# Patient Record
Sex: MALE | Race: WHITE | NOT HISPANIC OR LATINO | Employment: FULL TIME | URBAN - METROPOLITAN AREA
[De-identification: names, ages, dates, MRNs, and addresses within clinical notes are randomized per-mention and may not be internally consistent; named-entity substitution may affect disease eponyms.]

---

## 2023-03-04 LAB
CHOLESTEROL (MG/DL) IN SER/PLAS: 203 MG/DL (ref 0–199)
CHOLESTEROL IN HDL (MG/DL) IN SER/PLAS: 38.7 MG/DL
CHOLESTEROL/HDL RATIO: 5.2
LDL: 131 MG/DL (ref 0–99)
TRIGLYCERIDE (MG/DL) IN SER/PLAS: 167 MG/DL (ref 0–149)
VLDL: 33 MG/DL (ref 0–40)

## 2023-03-06 ENCOUNTER — TELEPHONE (OUTPATIENT)
Dept: PRIMARY CARE | Facility: CLINIC | Age: 38
End: 2023-03-06
Payer: COMMERCIAL

## 2023-03-06 NOTE — TELEPHONE ENCOUNTER
----- Message from Chris Perry DO sent at 3/6/2023  8:25 AM EST -----  Call pt his chol was now just slightly elevated but it went from 244 down to 203 this is very good.  His LDL went from 152 to 131. Keep up the good work.    Pt aware

## 2023-03-06 NOTE — RESULT ENCOUNTER NOTE
Call pt his chol was now just slightly elevated but it went from 244 down to 203 this is very good.  His LDL went from 152 to 131. Keep up the good work.

## 2023-09-06 ENCOUNTER — OFFICE VISIT (OUTPATIENT)
Dept: PRIMARY CARE | Facility: CLINIC | Age: 38
End: 2023-09-06
Payer: COMMERCIAL

## 2023-09-06 ENCOUNTER — TELEPHONE (OUTPATIENT)
Dept: PRIMARY CARE | Facility: CLINIC | Age: 38
End: 2023-09-06
Payer: COMMERCIAL

## 2023-09-06 VITALS
SYSTOLIC BLOOD PRESSURE: 128 MMHG | RESPIRATION RATE: 18 BRPM | TEMPERATURE: 97.4 F | HEART RATE: 74 BPM | WEIGHT: 252 LBS | OXYGEN SATURATION: 98 % | DIASTOLIC BLOOD PRESSURE: 82 MMHG | BODY MASS INDEX: 37.21 KG/M2

## 2023-09-06 DIAGNOSIS — R07.89 BURNING IN THE CHEST: ICD-10-CM

## 2023-09-06 DIAGNOSIS — R06.6 HICCUPS: Primary | ICD-10-CM

## 2023-09-06 PROBLEM — J01.90 ACUTE SINUSITIS: Status: ACTIVE | Noted: 2023-09-06

## 2023-09-06 PROBLEM — E78.5 HYPERLIPIDEMIA: Status: ACTIVE | Noted: 2023-09-06

## 2023-09-06 PROBLEM — S60.222A: Status: ACTIVE | Noted: 2023-09-06

## 2023-09-06 PROBLEM — S60.212A CONTUSION OF LEFT WRIST: Status: ACTIVE | Noted: 2023-09-06

## 2023-09-06 PROBLEM — E78.2 COMBINED HYPERLIPIDEMIA: Status: ACTIVE | Noted: 2023-09-06

## 2023-09-06 PROBLEM — R43.8 DECREASED SENSE OF SMELL: Status: ACTIVE | Noted: 2023-09-06

## 2023-09-06 PROBLEM — Q23.1 BICUSPID AORTIC VALVE (HHS-HCC): Status: ACTIVE | Noted: 2023-09-06

## 2023-09-06 PROBLEM — M25.519 SHOULDER PAIN: Status: ACTIVE | Noted: 2023-09-06

## 2023-09-06 PROBLEM — R07.9 CHEST PAIN: Status: ACTIVE | Noted: 2023-09-06

## 2023-09-06 PROBLEM — M25.319 SHOULDER INSTABILITY: Status: ACTIVE | Noted: 2023-09-06

## 2023-09-06 PROBLEM — N46.01 AZOOSPERMIA: Status: ACTIVE | Noted: 2023-09-06

## 2023-09-06 PROBLEM — I47.11 INAPPROPRIATE SINUS TACHYCARDIA (CMS-HCC): Status: ACTIVE | Noted: 2023-09-06

## 2023-09-06 PROBLEM — S43.109A DISLOCATION OF ACROMIOCLAVICULAR JOINT: Status: ACTIVE | Noted: 2023-09-06

## 2023-09-06 PROBLEM — S60.212A: Status: ACTIVE | Noted: 2023-09-06

## 2023-09-06 PROBLEM — J34.3 HYPERTROPHY OF BOTH INFERIOR NASAL TURBINATES: Status: ACTIVE | Noted: 2023-09-06

## 2023-09-06 PROBLEM — J34.89 NASAL OBSTRUCTION: Status: ACTIVE | Noted: 2023-09-06

## 2023-09-06 PROBLEM — Q23.81 BICUSPID AORTIC VALVE: Status: ACTIVE | Noted: 2023-09-06

## 2023-09-06 PROBLEM — J34.2 DEVIATED NASAL SEPTUM: Status: ACTIVE | Noted: 2023-09-06

## 2023-09-06 PROBLEM — R00.0 TACHYCARDIA: Status: ACTIVE | Noted: 2023-09-06

## 2023-09-06 PROBLEM — S43.432A SUPERIOR LABRUM ANTERIOR-TO-POSTERIOR (SLAP) TEAR OF LEFT SHOULDER: Status: ACTIVE | Noted: 2023-09-06

## 2023-09-06 PROBLEM — R06.09 DYSPNEA ON MINIMAL EXERTION: Status: ACTIVE | Noted: 2023-09-06

## 2023-09-06 PROBLEM — R09.81 NASAL CONGESTION: Status: ACTIVE | Noted: 2023-09-06

## 2023-09-06 PROBLEM — S40.012A CONTUSION OF LEFT SHOULDER: Status: ACTIVE | Noted: 2023-09-06

## 2023-09-06 PROBLEM — Z98.52 VASECTOMY STATUS: Status: ACTIVE | Noted: 2023-09-06

## 2023-09-06 PROCEDURE — 99213 OFFICE O/P EST LOW 20 MIN: CPT | Performed by: NURSE PRACTITIONER

## 2023-09-06 PROCEDURE — 1036F TOBACCO NON-USER: CPT | Performed by: NURSE PRACTITIONER

## 2023-09-06 PROCEDURE — 93000 ELECTROCARDIOGRAM COMPLETE: CPT | Performed by: NURSE PRACTITIONER

## 2023-09-06 RX ORDER — METOPROLOL SUCCINATE 25 MG/1
1 TABLET, EXTENDED RELEASE ORAL DAILY
COMMUNITY
Start: 2022-10-18

## 2023-09-06 RX ORDER — BUPROPION HYDROCHLORIDE 150 MG/1
150 TABLET ORAL EVERY MORNING
COMMUNITY

## 2023-09-06 RX ORDER — ONDANSETRON 8 MG/1
TABLET, ORALLY DISINTEGRATING ORAL
COMMUNITY
Start: 2023-02-07 | End: 2023-09-07 | Stop reason: ALTCHOICE

## 2023-09-06 RX ORDER — FLUTICASONE PROPIONATE 50 MCG
2 SPRAY, SUSPENSION (ML) NASAL DAILY
COMMUNITY
Start: 2023-05-03 | End: 2023-09-07 | Stop reason: ALTCHOICE

## 2023-09-06 ASSESSMENT — ENCOUNTER SYMPTOMS
NAUSEA: 0
RHINORRHEA: 0
COUGH: 0
ABDOMINAL PAIN: 0
CHEST TIGHTNESS: 0
VOMITING: 0
FEVER: 0
SORE THROAT: 0
CHILLS: 0
FATIGUE: 0
WHEEZING: 0
DIARRHEA: 0
APPETITE CHANGE: 0
SHORTNESS OF BREATH: 0

## 2023-09-06 NOTE — PROGRESS NOTES
Subjective   Patient ID: Donnell Sanabria is a 38 y.o. male who presents for Hiccups (Hiccups 24hr +).    HPI     Review of Systems    Objective   /82   Pulse 74   Temp 36.3 °C (97.4 °F) (Temporal)   Resp 18   Wt 114 kg (252 lb)   SpO2 98%   BMI 37.21 kg/m²     Physical Exam    Assessment/Plan

## 2023-09-06 NOTE — PROGRESS NOTES
Subjective   Patient ID: Donnell Sanabria is a 38 y.o. male who presents for Hiccups (Hiccups 24hr +).    Patient says that yesterday after dinner, he started having hiccups. He says that he would hold his breath for 5-10 seconds at a time and that would slightly help but he would continue to have hiccups during the night. Patient says that hiccups can occur very frequently throughout the hour and other times it is a minute in between. Patient says that he has been drinking very cold water throughout the day and that does not seem to be helping either. No chest pain. Patient did a physical maneuver in the office to relieve the hiccups, by pulling on his tongue. He says that as he did that, he got a burning sensation in his chest. He says that he has never really noticed this until now.     Patient says that he usually doesn't suffer from acid reflux. He does eat spicy/saucy foods and consumes alcohol socially and coffee daily. Patient had a sal, egg and cheese croissant sandwich with coffee for breakfast and half of a Mexican burrito for lunch.     Review of Systems   Constitutional:  Negative for appetite change, chills, fatigue and fever.   HENT:  Negative for congestion, postnasal drip, rhinorrhea and sore throat.    Respiratory:  Negative for cough, chest tightness, shortness of breath and wheezing.         Patient reports burning in the chest   Cardiovascular:  Negative for chest pain.   Gastrointestinal:  Negative for abdominal pain, diarrhea, nausea and vomiting.       Vitals:    09/06/23 1506   BP: 128/82   Pulse: 74   Resp: 18   Temp: 36.3 °C (97.4 °F)   SpO2: 98%       Physical Exam  Vitals reviewed.   Constitutional:       General: He is not in acute distress.     Appearance: Normal appearance. He is not ill-appearing or toxic-appearing.   HENT:      Head: Atraumatic.      Right Ear: Tympanic membrane, ear canal and external ear normal.      Left Ear: Tympanic membrane, ear canal and external ear normal.       Nose: Nose normal.      Mouth/Throat:      Mouth: Mucous membranes are moist.      Pharynx: Oropharynx is clear. No oropharyngeal exudate or posterior oropharyngeal erythema.   Eyes:      Conjunctiva/sclera: Conjunctivae normal.   Cardiovascular:      Rate and Rhythm: Normal rate and regular rhythm.      Heart sounds: Normal heart sounds. No murmur heard.  Pulmonary:      Effort: Pulmonary effort is normal.      Breath sounds: Normal breath sounds. No wheezing or rhonchi.   Abdominal:      General: Bowel sounds are normal.      Palpations: Abdomen is soft.      Tenderness: There is no abdominal tenderness. There is no guarding.   Musculoskeletal:         General: Normal range of motion.   Skin:     General: Skin is warm and dry.   Neurological:      General: No focal deficit present.      Mental Status: He is alert.   Psychiatric:         Mood and Affect: Mood normal.     Assessment/Plan   Problem List Items Addressed This Visit    None  Visit Diagnoses       Hiccups    -  Primary    Burning in the chest        Relevant Orders    ECG 12 lead (Clinic Performed) (Completed)        Patient held his tongue at the beginning of the visit to help relieve his hiccups. He did not have any hiccups after that point during the visit. Patient said that he had some burning in his chest after holding his tongue in the office. He has no chest pain. EKG done in the office and normal. Discussed with patient that for hiccups lasting less than 48 hours, he is to use physical maneuvers to terminate the hiccups. Pulling on his tongue for several seconds seemed to help. Discussed that he could bite into a lemon, pull his knees to his chest, or perform the valsalva maneuver (for 5-10 seconds) to help relieve his hiccups. Patient has an appt with Dr. Perry tomorrow. He was advised to follow up as scheduled. Patient advised to go to the ER for any worsening hiccups or new/concerning symptoms; he agreed.

## 2023-09-06 NOTE — TELEPHONE ENCOUNTER
Pt called and stated he has had hiccups for over 24 hrs.  I scheduled him for tomorrow at 240 but wanted to check to see if you advised any thing different

## 2023-09-07 ENCOUNTER — OFFICE VISIT (OUTPATIENT)
Dept: PRIMARY CARE | Facility: CLINIC | Age: 38
End: 2023-09-07
Payer: COMMERCIAL

## 2023-09-07 VITALS
RESPIRATION RATE: 18 BRPM | DIASTOLIC BLOOD PRESSURE: 80 MMHG | HEIGHT: 69 IN | BODY MASS INDEX: 36.88 KG/M2 | WEIGHT: 249 LBS | HEART RATE: 84 BPM | SYSTOLIC BLOOD PRESSURE: 124 MMHG | TEMPERATURE: 97.4 F

## 2023-09-07 DIAGNOSIS — R06.6 HICCUPS: Primary | ICD-10-CM

## 2023-09-07 DIAGNOSIS — R07.89 CHEST WALL PAIN: ICD-10-CM

## 2023-09-07 PROBLEM — F43.12 CHRONIC POST-TRAUMATIC STRESS DISORDER (PTSD): Status: ACTIVE | Noted: 2023-09-07

## 2023-09-07 PROBLEM — B07.9 VERRUCA: Status: ACTIVE | Noted: 2023-09-07

## 2023-09-07 PROBLEM — R04.0 EPISTAXIS: Status: ACTIVE | Noted: 2023-09-07

## 2023-09-07 PROBLEM — M21.40 ACQUIRED PES PLANUS: Status: ACTIVE | Noted: 2023-09-07

## 2023-09-07 PROBLEM — F43.12 POST-TRAUMATIC STRESS DISORDER, CHRONIC: Status: ACTIVE | Noted: 2023-09-07

## 2023-09-07 PROBLEM — F32.A DEPRESSIVE DISORDER: Status: ACTIVE | Noted: 2023-09-07

## 2023-09-07 PROBLEM — M54.50 LOW BACK PAIN: Status: ACTIVE | Noted: 2023-09-07

## 2023-09-07 PROBLEM — R10.11 RIGHT UPPER QUADRANT ABDOMINAL PAIN: Status: ACTIVE | Noted: 2023-09-07

## 2023-09-07 PROBLEM — M54.50 LUMBAGO: Status: ACTIVE | Noted: 2023-09-07

## 2023-09-07 PROBLEM — M21.70 ACQUIRED INEQUALITY OF LENGTH OF LOWER EXTREMITY: Status: ACTIVE | Noted: 2023-09-07

## 2023-09-07 PROBLEM — R03.0 ELEVATED BLOOD PRESSURE READING WITHOUT DIAGNOSIS OF HYPERTENSION: Status: ACTIVE | Noted: 2023-09-07

## 2023-09-07 PROCEDURE — 99213 OFFICE O/P EST LOW 20 MIN: CPT | Performed by: FAMILY MEDICINE

## 2023-09-07 ASSESSMENT — ENCOUNTER SYMPTOMS
HEADACHES: 0
RESPIRATORY NEGATIVE: 1
MUSCULOSKELETAL NEGATIVE: 1
ALLERGIC/IMMUNOLOGIC NEGATIVE: 1
NUMBNESS: 0
NAUSEA: 0
LIGHT-HEADEDNESS: 0
SPEECH DIFFICULTY: 0
ENDOCRINE NEGATIVE: 1
DIZZINESS: 0
CONSTITUTIONAL NEGATIVE: 1
ABDOMINAL PAIN: 0
VOMITING: 0
EYES NEGATIVE: 1
ABDOMINAL DISTENTION: 0
TREMORS: 0
HEMATOLOGIC/LYMPHATIC NEGATIVE: 1
PSYCHIATRIC NEGATIVE: 1
GASTROINTESTINAL NEGATIVE: 1

## 2023-09-07 NOTE — PROGRESS NOTES
Subjective   Patient ID: Donnell Sanabria is a 38 y.o. male who presents for Follow-up (1 day follow up from [facility] care for hiccups over the past 24 hrs. He states hick ups have subsided.   Pt states he does have chest heaviness and tightness.  ).  HPI    Review of Systems   Constitutional: Negative.    HENT: Negative.     Eyes: Negative.    Respiratory: Negative.     Cardiovascular:  Positive for chest pain.   Gastrointestinal: Negative.  Negative for abdominal distention, abdominal pain, nausea and vomiting.   Endocrine: Negative.    Genitourinary: Negative.    Musculoskeletal: Negative.    Skin: Negative.    Allergic/Immunologic: Negative.    Neurological:  Negative for dizziness, tremors, speech difficulty, light-headedness, numbness and headaches.   Hematological: Negative.    Psychiatric/Behavioral: Negative.         Objective   Physical Exam  Constitutional:       Appearance: Normal appearance.   HENT:      Head: Normocephalic.   Eyes:      Conjunctiva/sclera: Conjunctivae normal.   Cardiovascular:      Rate and Rhythm: Normal rate and regular rhythm.   Pulmonary:      Effort: Pulmonary effort is normal.      Breath sounds: Normal breath sounds.   Chest:          Comments: Tenderness anterior chest wall  Musculoskeletal:      Cervical back: Neck supple.   Skin:     General: Skin is warm and dry.   Neurological:      Mental Status: He is alert.         Assessment/Plan   Problem List Items Addressed This Visit    None  Visit Diagnoses       Hiccups    -  Primary    Chest wall pain            Suspect chest wasll pain was form 36 hours of hiccups.  Recommended pt use prilosec for 10-14 days to calm down esophagus and if symptoms return we will check labs and possible CT

## 2023-10-25 ENCOUNTER — HOSPITAL ENCOUNTER (OUTPATIENT)
Dept: CARDIOLOGY | Facility: CLINIC | Age: 38
Discharge: HOME | End: 2023-10-25
Payer: COMMERCIAL

## 2023-10-25 DIAGNOSIS — Z01.818 PRE-OP EXAM: ICD-10-CM

## 2023-10-25 PROCEDURE — 93010 ELECTROCARDIOGRAM REPORT: CPT | Performed by: INTERNAL MEDICINE

## 2023-10-25 PROCEDURE — 93005 ELECTROCARDIOGRAM TRACING: CPT

## 2023-11-05 LAB
ATRIAL RATE: 84 BPM
P AXIS: 14 DEGREES
P OFFSET: 180 MS
P ONSET: 121 MS
PR INTERVAL: 176 MS
Q ONSET: 209 MS
QRS COUNT: 14 BEATS
QRS DURATION: 96 MS
QT INTERVAL: 360 MS
QTC CALCULATION(BAZETT): 425 MS
QTC FREDERICIA: 402 MS
R AXIS: 17 DEGREES
T AXIS: 1 DEGREES
T OFFSET: 389 MS
VENTRICULAR RATE: 84 BPM

## 2023-11-06 ENCOUNTER — LAB (OUTPATIENT)
Dept: LAB | Facility: LAB | Age: 38
End: 2023-11-06
Payer: COMMERCIAL

## 2023-11-06 DIAGNOSIS — Z01.812 ENCOUNTER FOR PREPROCEDURAL LABORATORY EXAMINATION: Primary | ICD-10-CM

## 2023-11-06 LAB
ANION GAP SERPL CALC-SCNC: 13 MMOL/L (ref 10–20)
BUN SERPL-MCNC: 16 MG/DL (ref 6–23)
CALCIUM SERPL-MCNC: 9.7 MG/DL (ref 8.6–10.3)
CHLORIDE SERPL-SCNC: 102 MMOL/L (ref 98–107)
CO2 SERPL-SCNC: 25 MMOL/L (ref 21–32)
CREAT SERPL-MCNC: 0.97 MG/DL (ref 0.5–1.3)
ERYTHROCYTE [DISTWIDTH] IN BLOOD BY AUTOMATED COUNT: 12.2 % (ref 11.5–14.5)
GFR SERPL CREATININE-BSD FRML MDRD: >90 ML/MIN/1.73M*2
GLUCOSE SERPL-MCNC: 81 MG/DL (ref 74–99)
HCT VFR BLD AUTO: 45.9 % (ref 41–52)
HGB BLD-MCNC: 15 G/DL (ref 13.5–17.5)
INR PPP: 0.9 (ref 0.9–1.1)
MCH RBC QN AUTO: 29.3 PG (ref 26–34)
MCHC RBC AUTO-ENTMCNC: 32.7 G/DL (ref 32–36)
MCV RBC AUTO: 90 FL (ref 80–100)
NRBC BLD-RTO: 0 /100 WBCS (ref 0–0)
PLATELET # BLD AUTO: 262 X10*3/UL (ref 150–450)
POTASSIUM SERPL-SCNC: 4.1 MMOL/L (ref 3.5–5.3)
PROTHROMBIN TIME: 10.6 SECONDS (ref 9.8–12.8)
RBC # BLD AUTO: 5.12 X10*6/UL (ref 4.5–5.9)
SODIUM SERPL-SCNC: 136 MMOL/L (ref 136–145)
WBC # BLD AUTO: 7 X10*3/UL (ref 4.4–11.3)

## 2023-11-06 PROCEDURE — 36415 COLL VENOUS BLD VENIPUNCTURE: CPT

## 2023-11-06 PROCEDURE — 85610 PROTHROMBIN TIME: CPT

## 2023-11-06 PROCEDURE — 85027 COMPLETE CBC AUTOMATED: CPT

## 2023-11-06 PROCEDURE — 80048 BASIC METABOLIC PNL TOTAL CA: CPT

## 2023-11-13 ENCOUNTER — ANESTHESIA (OUTPATIENT)
Dept: OPERATING ROOM | Facility: CLINIC | Age: 38
End: 2023-11-13
Payer: COMMERCIAL

## 2023-11-13 ENCOUNTER — HOSPITAL ENCOUNTER (OUTPATIENT)
Facility: CLINIC | Age: 38
Setting detail: OUTPATIENT SURGERY
Discharge: HOME | End: 2023-11-13
Attending: OTOLARYNGOLOGY | Admitting: OTOLARYNGOLOGY
Payer: COMMERCIAL

## 2023-11-13 ENCOUNTER — ANESTHESIA EVENT (OUTPATIENT)
Dept: OPERATING ROOM | Facility: CLINIC | Age: 38
End: 2023-11-13
Payer: COMMERCIAL

## 2023-11-13 VITALS
OXYGEN SATURATION: 94 % | WEIGHT: 253.97 LBS | RESPIRATION RATE: 18 BRPM | DIASTOLIC BLOOD PRESSURE: 78 MMHG | HEART RATE: 81 BPM | BODY MASS INDEX: 37.62 KG/M2 | HEIGHT: 69 IN | SYSTOLIC BLOOD PRESSURE: 126 MMHG | TEMPERATURE: 97 F

## 2023-11-13 DIAGNOSIS — J34.89 NASAL OBSTRUCTION: Primary | ICD-10-CM

## 2023-11-13 DIAGNOSIS — J34.2 DEVIATED NASAL SEPTUM: ICD-10-CM

## 2023-11-13 PROCEDURE — 3700000002 HC GENERAL ANESTHESIA TIME - EACH INCREMENTAL 1 MINUTE: Performed by: OTOLARYNGOLOGY

## 2023-11-13 PROCEDURE — A30420 PR RECONSTR NOSE+MAJ SEPTAL REPAIR: Performed by: ANESTHESIOLOGY

## 2023-11-13 PROCEDURE — 2500000001 HC RX 250 WO HCPCS SELF ADMINISTERED DRUGS (ALT 637 FOR MEDICARE OP): Performed by: ANESTHESIOLOGY

## 2023-11-13 PROCEDURE — 2500000005 HC RX 250 GENERAL PHARMACY W/O HCPCS: Performed by: NURSE ANESTHETIST, CERTIFIED REGISTERED

## 2023-11-13 PROCEDURE — 7100000009 HC PHASE TWO TIME - INITIAL BASE CHARGE: Performed by: OTOLARYNGOLOGY

## 2023-11-13 PROCEDURE — 7100000010 HC PHASE TWO TIME - EACH INCREMENTAL 1 MINUTE: Performed by: OTOLARYNGOLOGY

## 2023-11-13 PROCEDURE — 7100000002 HC RECOVERY ROOM TIME - EACH INCREMENTAL 1 MINUTE: Performed by: OTOLARYNGOLOGY

## 2023-11-13 PROCEDURE — 2500000005 HC RX 250 GENERAL PHARMACY W/O HCPCS: Performed by: OTOLARYNGOLOGY

## 2023-11-13 PROCEDURE — 30465 REPAIR NASAL STENOSIS: CPT | Performed by: OTOLARYNGOLOGY

## 2023-11-13 PROCEDURE — 2500000001 HC RX 250 WO HCPCS SELF ADMINISTERED DRUGS (ALT 637 FOR MEDICARE OP): Performed by: OTOLARYNGOLOGY

## 2023-11-13 PROCEDURE — 31231 NASAL ENDOSCOPY DX: CPT | Performed by: OTOLARYNGOLOGY

## 2023-11-13 PROCEDURE — A30420 PR RECONSTR NOSE+MAJ SEPTAL REPAIR: Performed by: NURSE ANESTHETIST, CERTIFIED REGISTERED

## 2023-11-13 PROCEDURE — 2500000002 HC RX 250 W HCPCS SELF ADMINISTERED DRUGS (ALT 637 FOR MEDICARE OP, ALT 636 FOR OP/ED): Performed by: ANESTHESIOLOGY

## 2023-11-13 PROCEDURE — 3600000008 HC OR TIME - EACH INCREMENTAL 1 MINUTE - PROCEDURE LEVEL THREE: Performed by: OTOLARYNGOLOGY

## 2023-11-13 PROCEDURE — 30520 REPAIR OF NASAL SEPTUM: CPT | Performed by: OTOLARYNGOLOGY

## 2023-11-13 PROCEDURE — 3600000003 HC OR TIME - INITIAL BASE CHARGE - PROCEDURE LEVEL THREE: Performed by: OTOLARYNGOLOGY

## 2023-11-13 PROCEDURE — 2500000004 HC RX 250 GENERAL PHARMACY W/ HCPCS (ALT 636 FOR OP/ED): Performed by: NURSE ANESTHETIST, CERTIFIED REGISTERED

## 2023-11-13 PROCEDURE — 2720000007 HC OR 272 NO HCPCS: Performed by: OTOLARYNGOLOGY

## 2023-11-13 PROCEDURE — 30140 RESECT INFERIOR TURBINATE: CPT | Performed by: OTOLARYNGOLOGY

## 2023-11-13 PROCEDURE — 3700000001 HC GENERAL ANESTHESIA TIME - INITIAL BASE CHARGE: Performed by: OTOLARYNGOLOGY

## 2023-11-13 PROCEDURE — 7100000001 HC RECOVERY ROOM TIME - INITIAL BASE CHARGE: Performed by: OTOLARYNGOLOGY

## 2023-11-13 RX ORDER — LABETALOL HYDROCHLORIDE 5 MG/ML
5 INJECTION, SOLUTION INTRAVENOUS ONCE AS NEEDED
Status: DISCONTINUED | OUTPATIENT
Start: 2023-11-13 | End: 2023-11-13 | Stop reason: HOSPADM

## 2023-11-13 RX ORDER — OXYCODONE HYDROCHLORIDE 5 MG/1
5 TABLET ORAL EVERY 4 HOURS PRN
Status: DISCONTINUED | OUTPATIENT
Start: 2023-11-13 | End: 2023-11-13 | Stop reason: HOSPADM

## 2023-11-13 RX ORDER — SODIUM CHLORIDE, SODIUM LACTATE, POTASSIUM CHLORIDE, CALCIUM CHLORIDE 600; 310; 30; 20 MG/100ML; MG/100ML; MG/100ML; MG/100ML
100 INJECTION, SOLUTION INTRAVENOUS CONTINUOUS
Status: DISCONTINUED | OUTPATIENT
Start: 2023-11-13 | End: 2023-11-13 | Stop reason: HOSPADM

## 2023-11-13 RX ORDER — DEXAMETHASONE SODIUM PHOSPHATE 100 MG/10ML
INJECTION INTRAMUSCULAR; INTRAVENOUS AS NEEDED
Status: DISCONTINUED | OUTPATIENT
Start: 2023-11-13 | End: 2023-11-13

## 2023-11-13 RX ORDER — ONDANSETRON HYDROCHLORIDE 2 MG/ML
INJECTION, SOLUTION INTRAVENOUS AS NEEDED
Status: DISCONTINUED | OUTPATIENT
Start: 2023-11-13 | End: 2023-11-13

## 2023-11-13 RX ORDER — TRANEXAMIC ACID 100 MG/ML
INJECTION, SOLUTION INTRAVENOUS AS NEEDED
Status: DISCONTINUED | OUTPATIENT
Start: 2023-11-13 | End: 2023-11-13

## 2023-11-13 RX ORDER — HYDROMORPHONE HYDROCHLORIDE 1 MG/ML
0.5 INJECTION, SOLUTION INTRAMUSCULAR; INTRAVENOUS; SUBCUTANEOUS EVERY 5 MIN PRN
Status: DISCONTINUED | OUTPATIENT
Start: 2023-11-13 | End: 2023-11-13 | Stop reason: HOSPADM

## 2023-11-13 RX ORDER — LIDOCAINE HYDROCHLORIDE 10 MG/ML
0.1 INJECTION, SOLUTION EPIDURAL; INFILTRATION; INTRACAUDAL; PERINEURAL ONCE
Status: DISCONTINUED | OUTPATIENT
Start: 2023-11-13 | End: 2023-11-13 | Stop reason: HOSPADM

## 2023-11-13 RX ORDER — LIDOCAINE HYDROCHLORIDE 20 MG/ML
INJECTION, SOLUTION INFILTRATION; PERINEURAL AS NEEDED
Status: DISCONTINUED | OUTPATIENT
Start: 2023-11-13 | End: 2023-11-13

## 2023-11-13 RX ORDER — CELECOXIB 200 MG/1
CAPSULE ORAL AS NEEDED
Status: DISCONTINUED | OUTPATIENT
Start: 2023-11-13 | End: 2023-11-13

## 2023-11-13 RX ORDER — SUCCINYLCHOLINE CHLORIDE 100 MG/5ML
SYRINGE (ML) INTRAVENOUS AS NEEDED
Status: DISCONTINUED | OUTPATIENT
Start: 2023-11-13 | End: 2023-11-13

## 2023-11-13 RX ORDER — GABAPENTIN 300 MG/1
CAPSULE ORAL AS NEEDED
Status: DISCONTINUED | OUTPATIENT
Start: 2023-11-13 | End: 2023-11-13

## 2023-11-13 RX ORDER — PROPOFOL 10 MG/ML
INJECTION, EMULSION INTRAVENOUS AS NEEDED
Status: DISCONTINUED | OUTPATIENT
Start: 2023-11-13 | End: 2023-11-13

## 2023-11-13 RX ORDER — POVIDONE-IODINE 5 %
SOLUTION, NON-ORAL OPHTHALMIC (EYE) AS NEEDED
Status: DISCONTINUED | OUTPATIENT
Start: 2023-11-13 | End: 2023-11-13 | Stop reason: HOSPADM

## 2023-11-13 RX ORDER — MIDAZOLAM HYDROCHLORIDE 1 MG/ML
INJECTION, SOLUTION INTRAMUSCULAR; INTRAVENOUS AS NEEDED
Status: DISCONTINUED | OUTPATIENT
Start: 2023-11-13 | End: 2023-11-13

## 2023-11-13 RX ORDER — DOCUSATE SODIUM 100 MG/1
100 CAPSULE, LIQUID FILLED ORAL 2 TIMES DAILY PRN
Qty: 60 CAPSULE | Refills: 0 | Status: SHIPPED | OUTPATIENT
Start: 2023-11-13 | End: 2023-11-28 | Stop reason: ALTCHOICE

## 2023-11-13 RX ORDER — SODIUM CHLORIDE 0.65 %
2 AEROSOL, SPRAY (ML) NASAL
Qty: 44 ML | Refills: 3 | Status: SHIPPED | OUTPATIENT
Start: 2023-11-13 | End: 2024-01-24 | Stop reason: ALTCHOICE

## 2023-11-13 RX ORDER — LIDOCAINE HYDROCHLORIDE AND EPINEPHRINE 10; 10 MG/ML; UG/ML
INJECTION, SOLUTION INFILTRATION; PERINEURAL AS NEEDED
Status: DISCONTINUED | OUTPATIENT
Start: 2023-11-13 | End: 2023-11-13 | Stop reason: HOSPADM

## 2023-11-13 RX ORDER — ONDANSETRON 4 MG/1
4 TABLET, FILM COATED ORAL EVERY 6 HOURS PRN
Qty: 10 TABLET | Refills: 0 | Status: SHIPPED | OUTPATIENT
Start: 2023-11-13 | End: 2023-11-20

## 2023-11-13 RX ORDER — CEFAZOLIN 1 G/1
INJECTION, POWDER, FOR SOLUTION INTRAVENOUS AS NEEDED
Status: DISCONTINUED | OUTPATIENT
Start: 2023-11-13 | End: 2023-11-13

## 2023-11-13 RX ORDER — HYDROMORPHONE HYDROCHLORIDE 1 MG/ML
INJECTION, SOLUTION INTRAMUSCULAR; INTRAVENOUS; SUBCUTANEOUS AS NEEDED
Status: DISCONTINUED | OUTPATIENT
Start: 2023-11-13 | End: 2023-11-13

## 2023-11-13 RX ORDER — ALBUTEROL SULFATE 0.83 MG/ML
2.5 SOLUTION RESPIRATORY (INHALATION) ONCE AS NEEDED
Status: DISCONTINUED | OUTPATIENT
Start: 2023-11-13 | End: 2023-11-13 | Stop reason: HOSPADM

## 2023-11-13 RX ORDER — CEPHALEXIN 500 MG/1
500 CAPSULE ORAL 2 TIMES DAILY
Qty: 8 CAPSULE | Refills: 0 | Status: SHIPPED | OUTPATIENT
Start: 2023-11-13 | End: 2023-11-17

## 2023-11-13 RX ORDER — MUPIROCIN 20 MG/G
OINTMENT TOPICAL AS NEEDED
Status: DISCONTINUED | OUTPATIENT
Start: 2023-11-13 | End: 2023-11-13 | Stop reason: HOSPADM

## 2023-11-13 RX ORDER — TRANEXAMIC ACID 100 MG/ML
INJECTION, SOLUTION INTRAVENOUS AS NEEDED
Status: DISCONTINUED | OUTPATIENT
Start: 2023-11-13 | End: 2023-11-13 | Stop reason: HOSPADM

## 2023-11-13 RX ORDER — OXYMETAZOLINE HCL 0.05 %
SPRAY, NON-AEROSOL (ML) NASAL AS NEEDED
Status: DISCONTINUED | OUTPATIENT
Start: 2023-11-13 | End: 2023-11-13 | Stop reason: HOSPADM

## 2023-11-13 RX ORDER — FENTANYL CITRATE 50 UG/ML
INJECTION, SOLUTION INTRAMUSCULAR; INTRAVENOUS AS NEEDED
Status: DISCONTINUED | OUTPATIENT
Start: 2023-11-13 | End: 2023-11-13

## 2023-11-13 RX ORDER — PROPOFOL 10 MG/ML
INJECTION, EMULSION INTRAVENOUS CONTINUOUS PRN
Status: DISCONTINUED | OUTPATIENT
Start: 2023-11-13 | End: 2023-11-13

## 2023-11-13 RX ORDER — APREPITANT 40 MG/1
CAPSULE ORAL AS NEEDED
Status: DISCONTINUED | OUTPATIENT
Start: 2023-11-13 | End: 2023-11-13

## 2023-11-13 RX ORDER — ACETAMINOPHEN 325 MG/1
TABLET ORAL AS NEEDED
Status: DISCONTINUED | OUTPATIENT
Start: 2023-11-13 | End: 2023-11-13

## 2023-11-13 RX ORDER — METHOCARBAMOL 100 MG/ML
INJECTION, SOLUTION INTRAMUSCULAR; INTRAVENOUS AS NEEDED
Status: DISCONTINUED | OUTPATIENT
Start: 2023-11-13 | End: 2023-11-13

## 2023-11-13 RX ORDER — OXYCODONE AND ACETAMINOPHEN 5; 325 MG/1; MG/1
1 TABLET ORAL EVERY 6 HOURS PRN
Qty: 20 TABLET | Refills: 0 | Status: SHIPPED | OUTPATIENT
Start: 2023-11-13 | End: 2023-11-28 | Stop reason: ALTCHOICE

## 2023-11-13 RX ORDER — BUPIVACAINE HYDROCHLORIDE 5 MG/ML
INJECTION, SOLUTION PERINEURAL AS NEEDED
Status: DISCONTINUED | OUTPATIENT
Start: 2023-11-13 | End: 2023-11-13 | Stop reason: HOSPADM

## 2023-11-13 RX ORDER — ONDANSETRON HYDROCHLORIDE 2 MG/ML
4 INJECTION, SOLUTION INTRAVENOUS ONCE AS NEEDED
Status: DISCONTINUED | OUTPATIENT
Start: 2023-11-13 | End: 2023-11-13 | Stop reason: HOSPADM

## 2023-11-13 RX ADMIN — MIDAZOLAM 2 MG: 1 INJECTION INTRAMUSCULAR; INTRAVENOUS at 07:38

## 2023-11-13 RX ADMIN — TRANEXAMIC ACID 1000 MG: 100 INJECTION, SOLUTION INTRAVENOUS at 08:35

## 2023-11-13 RX ADMIN — ONDANSETRON 4 MG: 2 INJECTION INTRAMUSCULAR; INTRAVENOUS at 10:00

## 2023-11-13 RX ADMIN — FENTANYL CITRATE 100 MCG: 50 INJECTION, SOLUTION INTRAMUSCULAR; INTRAVENOUS at 07:43

## 2023-11-13 RX ADMIN — METHOCARBAMOL 500 MG: 100 INJECTION, SOLUTION INTRAMUSCULAR; INTRAVENOUS at 08:55

## 2023-11-13 RX ADMIN — PROPOFOL 250 MG: 10 INJECTION, EMULSION INTRAVENOUS at 07:43

## 2023-11-13 RX ADMIN — GABAPENTIN 300 MG: 300 CAPSULE ORAL at 07:30

## 2023-11-13 RX ADMIN — CELECOXIB 200 MG: 200 CAPSULE ORAL at 07:30

## 2023-11-13 RX ADMIN — HYDROMORPHONE HYDROCHLORIDE 0.2 MG: 1 INJECTION, SOLUTION INTRAMUSCULAR; INTRAVENOUS; SUBCUTANEOUS at 09:45

## 2023-11-13 RX ADMIN — OXYCODONE 5 MG: 5 TABLET ORAL at 11:37

## 2023-11-13 RX ADMIN — CEFAZOLIN 2 G: 1 INJECTION, POWDER, FOR SOLUTION INTRAMUSCULAR; INTRAVENOUS at 07:54

## 2023-11-13 RX ADMIN — PROPOFOL 75 MCG/KG/MIN: 10 INJECTION, EMULSION INTRAVENOUS at 07:48

## 2023-11-13 RX ADMIN — ACETAMINOPHEN 975 MG: 325 TABLET ORAL at 07:30

## 2023-11-13 RX ADMIN — SODIUM CHLORIDE, SODIUM LACTATE, POTASSIUM CHLORIDE, AND CALCIUM CHLORIDE: .6; .31; .03; .02 INJECTION, SOLUTION INTRAVENOUS at 07:38

## 2023-11-13 RX ADMIN — APREPITANT 40 MG: 40 CAPSULE ORAL at 07:30

## 2023-11-13 RX ADMIN — METHOCARBAMOL 500 MG: 100 INJECTION, SOLUTION INTRAMUSCULAR; INTRAVENOUS at 09:32

## 2023-11-13 RX ADMIN — HYDROMORPHONE HYDROCHLORIDE 0.3 MG: 1 INJECTION, SOLUTION INTRAMUSCULAR; INTRAVENOUS; SUBCUTANEOUS at 09:14

## 2023-11-13 RX ADMIN — FENTANYL CITRATE 25 MCG: 50 INJECTION, SOLUTION INTRAMUSCULAR; INTRAVENOUS at 08:04

## 2023-11-13 RX ADMIN — Medication 100 MG: at 07:43

## 2023-11-13 RX ADMIN — FENTANYL CITRATE 25 MCG: 50 INJECTION, SOLUTION INTRAMUSCULAR; INTRAVENOUS at 08:16

## 2023-11-13 RX ADMIN — DEXAMETHASONE SODIUM PHOSPHATE 10 MG: 10 INJECTION INTRAMUSCULAR; INTRAVENOUS at 07:54

## 2023-11-13 RX ADMIN — FENTANYL CITRATE 25 MCG: 50 INJECTION, SOLUTION INTRAMUSCULAR; INTRAVENOUS at 08:45

## 2023-11-13 RX ADMIN — LIDOCAINE HYDROCHLORIDE 100 MG: 20 INJECTION, SOLUTION INFILTRATION; PERINEURAL at 07:43

## 2023-11-13 ASSESSMENT — COLUMBIA-SUICIDE SEVERITY RATING SCALE - C-SSRS
1. IN THE PAST MONTH, HAVE YOU WISHED YOU WERE DEAD OR WISHED YOU COULD GO TO SLEEP AND NOT WAKE UP?: NO
6. HAVE YOU EVER DONE ANYTHING, STARTED TO DO ANYTHING, OR PREPARED TO DO ANYTHING TO END YOUR LIFE?: NO
2. HAVE YOU ACTUALLY HAD ANY THOUGHTS OF KILLING YOURSELF?: NO

## 2023-11-13 ASSESSMENT — PAIN SCALES - GENERAL
PAINLEVEL_OUTOF10: 0 - NO PAIN
PAINLEVEL_OUTOF10: 0 - NO PAIN
PAINLEVEL_OUTOF10: 5 - MODERATE PAIN
PAINLEVEL_OUTOF10: 0 - NO PAIN
PAINLEVEL_OUTOF10: 4

## 2023-11-13 ASSESSMENT — PAIN - FUNCTIONAL ASSESSMENT: PAIN_FUNCTIONAL_ASSESSMENT: 0-10

## 2023-11-13 NOTE — ANESTHESIA PREPROCEDURE EVALUATION
Patient: Donnell Sanabria    Procedure Information       Date/Time: 11/13/23 1430    Procedure: SEPTO-RHINOPLASTY, INFERIOR TURBINATE REDUCTION, REPAIR NASAL VALVES, NASAL ENDOCSOPY (Nose)    Location: [facility] OR 04 / [facility] OR    Surgeons: Jonathan K Frankel, MD            Relevant Problems   Anesthesia (within normal limits)      Cardiovascular   (+) Bicuspid aortic valve (Being followed by cardiology)   (+) Chest pain   (+) Combined hyperlipidemia   (+) Hyperlipidemia   (+) Inappropriate sinus tachycardia      /Renal   (+) Renal calculi      Neuro/Psych   (+) Chronic post-traumatic stress disorder (PTSD)   (+) Depressive disorder   (+) Post traumatic stress disorder (PTSD)   (+) Post-traumatic stress disorder, chronic      Pulmonary   (+) Dyspnea on minimal exertion      GI/Hepatic (within normal limits)      Infectious Disease   (+) Herpes simplex of male genitalia   (+) Verruca       Clinical information reviewed:   Tobacco  Allergies  Meds   Med Hx  Surg Hx   Fam Hx          NPO Detail:  NPO/Void Status  Carbonhydrate Drink Given Prior to Surgery? : N  Date of Last Liquid: 11/12/23  Time of Last Liquid: 2100  Date of Last Solid: 11/12/23  Time of Last Solid: 2100  Last Intake Type: Clear fluids         Physical Exam    Airway  Mallampati: II  TM distance: >3 FB  Neck ROM: full     Cardiovascular    Dental - normal exam     Pulmonary    Abdominal        Anesthesia Plan    ASA 2     general     intravenous induction   Anesthetic plan and risks discussed with patient.    Plan discussed with CRNA.

## 2023-11-13 NOTE — OP NOTE
Nephrology Progress Note:    ESRD.  Patient had hemodialysis yesterday.  On hemodialysis Monday Wednesday and Friday.  Feels well.  Planned discharge home today.  No shortness of breath.  Denies nausea or vomiting.  Anxious to go home.  Has PermCath in place.    Patient Active Problem List   Diagnosis   • PVD (peripheral vascular disease)   • Status post bilateral below knee amputation   • Renal failure, chronic   • Gastroesophageal reflux disease   • Constipation   • Anxiety   • ESRD on hemodialysis   • A-V fistula   • Symptomatic bradycardia   • Hypertension   • Diabetes mellitus   • Gangrene of finger   • ESRD (end stage renal disease)   • Finger pain, right   • Type 2 diabetes mellitus without complication, with long-term current use of insulin   • Carpal tunnel syndrome of right wrist   • Acute bacterial conjunctivitis of both eyes   • Community acquired pneumonia of left lower lobe of lung   • Acute renal failure superimposed on chronic kidney disease, on chronic dialysis   • Hyponatremia   • Skin irritation - groin   • Hypothermia   • Loculated pleural effusion   • Other constipation       Medications:    amLODIPine 5 mg Oral Daily   atorvastatin 20 mg Oral Nightly   citalopram 20 mg Oral Daily   clopidogrel 75 mg Oral Nightly   epoetin unruly 3,000 Units Intravenous Once per day on Mon Wed Fri   ferrous sulfate 324 mg Oral Daily With Breakfast   hydrALAZINE 50 mg Oral TID   insulin aspart 0-7 Units Subcutaneous 4x Daily AC & at Bedtime   ipratropium-albuterol 3 mL Nebulization Q4H - RT   levoFLOXacin 750 mg Oral Every Other Day   metoclopramide 5 mg Oral TID AC   metoprolol tartrate 25 mg Oral Q12H   pantoprazole 40 mg Oral Daily   sennosides-docusate sodium 2 tablet Oral Nightly   sevelamer 800 mg Oral TID With Meals   trimethoprim-polymyxin b 1 drop Both Eyes Q4H       hold 1 each     Vitals:    04/21/18 0657 04/21/18 0802 04/21/18 0915 04/21/18 1238   BP:   180/66 143/64   BP Location:   Left arm    Patient  SEPTO-RHINOPLASTY, INFERIOR TURBINATE REDUCTION, REPAIR NASAL VALVES, NASAL ENDOCSOPY Operative Note     Date: 2023  OR Location: [facility] OR    Name: Donnell Sanabria : 1985, Age: 38 y.o., MRN: [id number], Sex: male    Diagnosis  Pre-op Diagnosis     * Deviated nasal septum [J34.2] Post-op Diagnosis     * Deviated nasal septum [J34.2]     Procedures  SEPTO-RHINOPLASTY, INFERIOR TURBINATE REDUCTION, REPAIR NASAL VALVES, NASAL ENDOCSOPY  29166 - MS SEPTOPLASTY/SUBMUCOUS RESECJ W/WO CARTILAGE GRF    SEPTO-RHINOPLASTY, INFERIOR TURBINATE REDUCTION, REPAIR NASAL VALVES, NASAL ENDOCSOPY  06612 - MS REPAIR NASAL VESTIBULAR STENOSIS    SEPTO-RHINOPLASTY, INFERIOR TURBINATE REDUCTION, REPAIR NASAL VALVES, NASAL ENDOCSOPY  43328 - MS SUBMUCOUS RESCJ INFERIOR TURBINATE PRTL/COMPL    SEPTO-RHINOPLASTY, INFERIOR TURBINATE REDUCTION, REPAIR NASAL VALVES, NASAL ENDOCSOPY  48539 - MS NASAL ENDOSCOPY DIAGNOSTIC UNI/BI SPX      Surgeons      * Jonathan K Frankel - Primary    Resident/Fellow/Other Assistant:  Surgeon(s) and Role:    Procedure Summary  Anesthesia: General  ASA: II  Anesthesia Staff: Anesthesiologist: Desean Salazar DO  CRNA: NAV Smith-CRNA  Estimated Blood Loss: 20 mL  Intra-op Medications:   Medication Name Total Dose   tranexamic acid (Cyklokapron) injection 0.2 g   lidocaine-epinephrine (Xylocaine W/EPI) 1 %-1:100,000 injection 10 mL   mupirocin (Bactroban) 2 % ointment 1 Application   oxymetazoline (Afrin) 0.05 % nasal spray 2 spray   povidone-iodine 5 % ophthalmic solution 1 Application   BUPivacaine HCl (Marcaine) 0.5 % (5 mg/mL) injection 10 mL              Anesthesia Record               Intraprocedure I/O Totals          Intake    .00 mL    Propofol Drip 0.00 mL    The total shown is the total volume documented since Anesthesia Start was filed.    Total Intake 750 mL       Output    Est. Blood Loss 30 mL    Total Output 30 mL       Net    Net Volume 720 mL           Specimen: No specimens collected     Staff:   Circulator: Laurent Blair RN; Valeria Guardado RN  Scrub Person: Elisabet Cifuentes         Drains and/or Catheters: * None in log *            Findings: Dorsal and caudal cartilaginous septal deviation.  Bilateral inferior turbinate hypertrophy.  Narrow internal nasal valves.  Nasal valve collapse.    Indications: Donnell Sanabria is an 38 y.o. male who is having surgery for Deviated nasal septum [J34.2].     The patient was seen in the preoperative area. The risks, benefits, complications, treatment options, non-operative alternatives, expected recovery and outcomes were discussed with the patient. The possibilities of reaction to medication, pulmonary aspiration, injury to surrounding structures, bleeding, recurrent infection, the need for additional procedures, failure to diagnose a condition, and creating a complication requiring transfusion or operation were discussed with the patient. The patient concurred with the proposed plan, giving informed consent.  The site of surgery was properly noted/marked if necessary per policy. The patient has been actively warmed in preoperative area. Preoperative antibiotics have been ordered and given within 1 hours of incision. Venous thrombosis prophylaxis have been ordered including bilateral sequential compression devices    Procedure Details:      SURGICAL INDICATIONS: The patient elected to proceed with above stated procedures after risks, benefits, and alternatives of the procedure were discussed in detail to the patient in the clinic in preoperative setting.  All questions were thoroughly addressed.  No guarantees were given.           OPERATIVE REPORT: The patient was taken to the operating room by the Anesthesiology team.  The patient was sedated and intubated with the oral endotracheal tube by the anesthesiology team. Time-out was performed. The bed was turned 90 degrees counterclockwise and the patient was  Position:   Lying    Pulse: 56 57 62 60   Resp: 18  18 18   Temp:   98.2 °F (36.8 °C) 97.6 °F (36.4 °C)   TempSrc:   Temporal Artery  Temporal Artery    SpO2: 97%  92% 95%   Weight:       Height:         I/O last 3 completed shifts:  In: 1200 [P.O.:1200]  Out: 2500 [Other:2500]  I/O this shift:  In: 720 [P.O.:720]  Out: -     On examination:  General:  Lying down comfortably, No distress.  Alert and oriented.  Lying down comfortably.  Son at bedside.  Neck: No JVP or thyroid enlargement  HEENT:  Pallor, no icterus, erythema around the eyelids.  Chest:  Coarse breath sounds at the lung bases.  No wheeze.  Using PC for HD.   CVS:  Heart sounds are regular, there is no pericardial rub or gallop.  Abdomen:  Soft, distended, normal bowel sounds, no organomegaly.  Extremities:  Bilateral amputation.  Old access on the right forearm  Neuro:Alert and comfortable.  No change in neurological status  Mentation: alert and oriented    Past medical illness, social history, medications, previous notes reviewed.       Laboratory results:      Recent Results (from the past 24 hour(s))   POC Glucose Once    Collection Time: 04/20/18  4:41 PM   Result Value Ref Range    Glucose 105 70 - 130 mg/dL   POC Glucose Once    Collection Time: 04/20/18  8:34 PM   Result Value Ref Range    Glucose 128 70 - 130 mg/dL   Comprehensive Metabolic Panel    Collection Time: 04/21/18  6:35 AM   Result Value Ref Range    Glucose 97 60 - 100 mg/dL    BUN 17 7 - 21 mg/dL    Creatinine 2.82 (H) 0.50 - 1.00 mg/dL    Sodium 133 (L) 137 - 145 mmol/L    Potassium 3.5 3.5 - 5.1 mmol/L    Chloride 94 (L) 95 - 110 mmol/L    CO2 28.0 22.0 - 31.0 mmol/L    Calcium 9.2 8.4 - 10.2 mg/dL    Total Protein 6.5 6.3 - 8.6 g/dL    Albumin 3.20 (L) 3.40 - 4.80 g/dL    ALT (SGPT) 16 9 - 52 U/L    AST (SGOT) 14 14 - 36 U/L    Alkaline Phosphatase 69 38 - 126 U/L    Total Bilirubin 0.3 0.2 - 1.3 mg/dL    eGFR Non African Amer 17 (L) >60 mL/min/1.73    Globulin 3.3 2.3 - 3.5  gm/dL    A/G Ratio 1.0 (L) 1.1 - 1.8 g/dL    BUN/Creatinine Ratio 6.0 (L) 7.0 - 25.0    Anion Gap 11.0 5.0 - 15.0 mmol/L   CBC Auto Differential    Collection Time: 04/21/18  6:35 AM   Result Value Ref Range    WBC 6.36 3.20 - 9.80 10*3/mm3    RBC 3.90 3.77 - 5.16 10*6/mm3    Hemoglobin 11.0 (L) 12.0 - 15.5 g/dL    Hematocrit 33.3 (L) 35.0 - 45.0 %    MCV 85.4 80.0 - 98.0 fL    MCH 28.2 26.5 - 34.0 pg    MCHC 33.0 31.4 - 36.0 g/dL    RDW 14.2 11.5 - 14.5 %    RDW-SD 44.3 36.4 - 46.3 fl    MPV 10.0 8.0 - 12.0 fL    Platelets 188 150 - 450 10*3/mm3    Neutrophil % 75.3 37.0 - 80.0 %    Lymphocyte % 12.3 10.0 - 50.0 %    Monocyte % 8.2 0.0 - 12.0 %    Eosinophil % 3.6 0.0 - 7.0 %    Basophil % 0.3 0.0 - 2.0 %    Immature Grans % 0.3 0.0 - 0.5 %    Neutrophils, Absolute 4.79 2.00 - 8.60 10*3/mm3    Lymphocytes, Absolute 0.78 0.60 - 4.20 10*3/mm3    Monocytes, Absolute 0.52 0.00 - 0.90 10*3/mm3    Eosinophils, Absolute 0.23 0.00 - 0.70 10*3/mm3    Basophils, Absolute 0.02 0.00 - 0.20 10*3/mm3    Immature Grans, Absolute 0.02 0.00 - 0.02 10*3/mm3   POC Glucose Once    Collection Time: 04/21/18  7:20 AM   Result Value Ref Range    Glucose 91 70 - 130 mg/dL   POC Glucose Once    Collection Time: 04/21/18 10:43 AM   Result Value Ref Range    Glucose 149 (H) 70 - 130 mg/dL   ]    Imaging Results (last 24 hours)     ** No results found for the last 24 hours. **            Assessment:     End-stage renal disease  Admitted with hypoglycemia and hypothermia  Left lower lobe opacity on chest x-ray, Pneumonia on CT scan along with pleural effusions  Shortness of breath, pleural effusions  Possible urinary tract infection  Anemia of chronic kidney disease  Noncompliance with dialysis treatments  Redman catheter in place    Plan:     ESRD.  Patient on dialysis on Monday Wednesday and Friday. Patient had hemodialysis yesterday.  Volume status and serum potassium levels are stable.  Serum potassium 3.5.      History of noncompliance  positioned appropriately.  Afrin pledgets were placed in bilateral nasal cavities.  1% lidocaine with 1:100,000 units of epinephrine were used to inject the nose in standard fashion after the planned sites of incision and surgical markings were made.           A 0-degree Villalobos abigail was used to perform bilateral nasal endoscopy with the above stated findings.  A 2-mm inferior turbinate blade was used to make a stab incision at the anterior head of the inferior turbinate.  Submucosal dissection and microdebridement was performed. The Boies elevator was used to outfracture the inferior turbinate.  The same was performed on the contralateral side.           A marginal incision was made with a #15 blade.  Curved iris scissors were used to perform sharp and blunt dissection to skeletonize the lower lateral cartilage.  The same was performed on the contralateral side.  A #15 blade was used to make the columellar incision.  The columellar flap was raised and connected to the marginal incision.  The soft tissue envelope was raised to the level of the nasal bones.            The anterior septal angle was identified.  Dissection was then performed to raise bilateral mucoperichondrial flaps.  The upper lateral cartilages were  from the dorsal septum using a #15 blade.  A 1.5 cm dorsal strut and 1 cm caudal strut were marked and then the remaining deviated portion of the quadrangular cartilage was resected.  This was set in saline on the back table.  The deviated portions of the bony septum were resected with a double-action scissors and a 4-mm osteotome.     Scoring incisions were made on the concave aspect of the L strut.     grafts were fashioned from the resected portion of the quadrangular cartilage.  The  grafts and the upper lateral cartilages were secured to the dorsal septal cartilage with 5-0 PDS suture in a horizontal mattress fashion.    Hydro dissection was performed with local anesthetic  to release the vestibular skin from the lower lateral crural cartilage.  The cephalic edge was incised and dissection was performed in a subperichondrial plane on the undersurface of the lower lateral crural cartilage.  A pocket was then made along the axis of the lower lateral crural cartilage extending to overlie the piriform aperture.  The lower lateral crural strut graft was placed within this pocket and secured to the undersurface of the lower lateral crural cartilage with 5-0 PDS suture in a horizontal mattress fashion.  The same was performed on the contralateral side.       A columellar strut graft was placed in a pocket between the medial crura and used to suture the medial crura with 5-0 PDS suture in a horizontal mattress fashion.    Cephalically oriented intradomal sutures were placed with 5-0 PDS.    The soft tissue envelope was lowered and the nose was inspected for additional deformities.            The columellar incision was closed with 5-0 fast-absorbing gut sutures in simple interrupted fashion.  A 5-0 chromic gut suture was used to close the marginal incision in a simple interrupted fashion.            Mayer splints were coated with bacitracin and placed in appropriate position.  The Mayer splints were secured with 3-0 Prolene suture.  Surgicel coated with bacitracin was placed in the soft tissue triangles bilaterally.  The nasal dressing was then applied with Mastisol, half-inch micropore tape, and Aquaplast in standard fashion.  This concluded the procedure.  The patient was weaned from sedation, extubated, and returned to the PACU in stable condition.  There were no complications of the procedure and it was tolerated well by the patient.  Postoperative instructions were given to the patient and reviewed prior to surgery.  All prescriptions were given in the postoperative area.       Complications:  None; patient tolerated the procedure well.    Disposition: PACU - hemodynamically  with dialysis treatments.  She has missed several dialysis treatments in the last month.  I advised patient not to miss dialysis treatments, due to complications of hyperkalemia, volume overload.  She has a left pleural effusion and needs fluid removal on dialysis as tolerated.  Patient agrees to be more compliant with dialysis treatments.  I have discussed with her son as well.     Pneumonia: Patient had contrast CT which showed effusion.  No mass was noted on that study.   Thoracentesis was done.  Exudative study.  On oral antibiotics.    Mild hyperkalemia, serum potassium was 5.2.  Current serum potassium is 3.5.  Low potassium diet.  Compliance with dialysis treatments.      Anemia of chronic kidney disease:  H&H stable.  Hemoglobin 11 with a hematocrit of 33.3.  Erythropoietin injections on dialysis according to protocol.    Discussed with patient in detail.        Shawn Dela Cruz MD     stable.  Condition: stable         Additional Details:     Attending Attestation: I was present and scrubbed for the key portions of the procedure.    Jonathan K Frankel  Phone Number: [phone]

## 2023-11-13 NOTE — DISCHARGE INSTRUCTIONS
.     Post Op Highlights   You may experience bloody drainage in yourmouth (including small clots). This is normal, especially in the morning or after sleeping. You should notify your doctor if the bleeding is profuse or continuous.   Take prescribed pain medication as needed forpain. If you experience nausea or headaches (common side effects of narcotic pain medication) stop taking the prescription pain medication and use extra strength Tylenol/Motrin/Advil/Ibuprofen for pain instead.   If you experience bleeding from the nose useAfrin as prescribed. Afrin Regimen: 3 sprays eachnostril, apply pressure and wait 10 minutes, ifbleeding persists use another 3 sprays; again,apply pressure and wait 10 minutes, if stillbleeding repeat Afrin a 3rd time. After usingAfrin, 3 sprays x 3 over 30 minutes, if bleeding does not stop contact your doctor.  Do NOT blow your nose for 10 days after surgery.  Do NOT wear glasses for 21 days after surgery.  You may only wear glasses while also wearing thesplint on the nose for the first 6 weeks  after surgery.     Things to expect:   1. Swelling of the surrounding tissues may become a little greater the second day or third day after surgery.   2. Discoloration or bruising of the surrounding tissues is normal. The bruising may be greater on the second or third day. It usually does not last more than 1 week.   3. Nasal blockage is to be expected after a Rhinoplasty procedure and will gradually subside over a period of time.   4. There is usually some pain following nasal surgery, this may become worse at night or when you become anxious or nervous. Take the pain medicine prescribed or over the counter alternative as needed.   5. Numbness or tingling sensation at the tip of the nose and/or upper lip is normal, this will improve throughout the healing process.   6. Occasionally, crusting will occur around the sutures. Do not try and remove this yourself. This is normal and will resolve.  Patting the area with a warm moist compress or hydrogen peroxide applied with cotton ball may help this to fall off.   7. You may experience some weakness or dizziness. This generally will clear up after a few days. Be sure to drink plenty of fluids.   8. You may experience some sharp shooting pain or itching during you healing process. This is normal and will resolve in a few weeks.   9. You may experience a period of mild depression after cosmetic surgery. This is related to the shock of seeing your nose swollen and discolored. Remember this is a temporary condition.  10. You may be up and around the house without performing any strenuous activities.  11. You should wear clothing that fastens in the front for one week. Do not wear clothes that have to be pulled over the head.    Most of all, be patient during the healing process. If you have further questions, you are urged to call us.     Things to avoid:  15. No contact sports for 2 months.  16. Do not blow your nose for 10 days. After that blow through both sides at once. Do not compress one side.  17. Avoid sneezing, if you must sneeze with your   mouth open.  18. Avoid bending over or lifting heavy objects for 1 week.  19. Avoid excessive grinning and smiling.  20. Avoid sniffing. This can aggravate the swelling.   21. Avoid constantly rubbing the nostrils and base of the nose with Kleenex or a handkerchief. This can aggravate the swelling.  22. Avoid hitting or bumping your nose. It is wise not to  small children.  23. Avoid straining at the stool; you may take a laxative if needed. Any over the counter stool softener will do.  24. Avoid sunning of the face for one month.  25. Avoid sexual intercourse for 2 weeks after your surgery.  26. Do not tweeze the eyebrows for one week.  27. No swimming, strenuous athletic activity or exercises for 4 weeks.  28. Do not drive while taking any sedative or   pain medications.  29. Do not try to remove the sutures  yourself.  Things to report  1. Report any excessive bleeding that persists after applying pressure for 20 minutes.   2. Report any excessive swelling and /or pain.   3. Report any signs of infection such as excessive swelling, redness, sudden increase in pain or drainage, elevation in temperature, greater than (100.1 degrees).  4. Report development of any drug reaction. (Rash, hives or itching).  5. Report if a lump develops or becomes larger.    After office hours, on weekends or holidays please call the answering service. Let them know you are a post-op patient of Dr. Frankel's and they will page him. He will return your call directly.     Resuming Activities:  1. Glasses may be worn as soon as long as the splint remains on the nose. After that, they must be suspended from the forehead for a period of about 6 weeks. Do not rest glasses on nose without support.  2. Contacts may be inserted the day after surgery.  3. Wash the face gently with a mild soap or cleanser twice daily beginning the day after surgery.  4. Returning to work depends on the amount of physical activity and public contact your job involves and also the amount of swelling and discoloration you develop; the average patient may return to work or go out socially 8 days   after surgery when these factors are minimal. There is individual variation regarding the time   one returns to work.    Your first post-op office visit:   YOUR POST OP APPOINTMENT WAS SCHEDULED AT THE SAME TIME AS YOUR SURGERY WAS SCHEDULED. IF YOU NEED TO CONFIRM DATE AND TIME PLEASE CALL THE OFFICE [phone].    1. Eat a small meal prior to your appointment.  2. The cast and splints will be removed and the progress of your healing will be checked.  3. Splint and cast removal is a quick and   simple process that generally results in   minimal discomfort.  4. If you are concerned about discomfort, you may take prescribed pain medication or ibuprofen 45 minutes prior to the  appointment. If you take prescribed narcotic pain medication you will need a .   5. After all stitches have been removed or dissolve, the scars will appear a deep pink color. With time, the pink will fade and become white, the firmness of the scar will soften, and they will become less noticeable.   6. Nasal swelling will progressively resolve over the first 3-6 months. Each individual varies with respect to healing, but it takes approximately an entire year for resolution of swelling and healing of any scar.      Daily Care  1.  Ice compresses (do not use a bag of ice cubes, afrozen bag peas or a cold compress is acceptable)should be applied intermittently (20 minutes on/ 20minutes off) throughout the day while awake. Thiswill decrease bruising and swelling and will help withpain. You may continue to ice as needed after the72-hour stephane.  2. Place wash towel in icy water, ring out excess  water and place over eyes. This will help withbruising and swelling.  3. You may use Afrin for post op bleeding for the first72 hours. Discontinue afrin use after day 3.  4. The Saline spray should be used 4 to 5 times whileawake every day until you return for your post-opvisit and splint removal.  5. Change mustache dressing only as needed. Try not to change it more than twice daily. May remove once bleeding has subsided.  6. You may bathe the next day after your nasal surgery. It is okay to get the nasal cast wet. If it becomes loose please notify the office.  7. Sleep on your back with your head elevated about 30-40 degrees. You are encouraged to sleep this   way for approximately 2-3 days to minimize bruising and swelling.  8. It is best to sleep alone for one or two weeks afteryour operation.  9. Eat soft foods for 2-3 days. I.E. (Jell-O, pudding,mashed potatoes). Chewing can cause an increase inpain and swelling.  10. Talking should be minimized. Excessive talking,laughing and chewing can cause increased pain  and  swelling.  11. To avoid an upset stomach, eat something beforetaking any medication.  12. Take the pain medicine prescribed as needed or ExtraStrength Tylenol and/or Ibuprofen as needed.  13. Continue taking your antibiotic as prescribed until  it's finished.  14. You can use Chap Stick or Vaseline for dry lips.    LAST DOSE OF PAIN MEDICATION:  _________________________________________________________________________________________________________________________________  RESUME TAKING ROUTINE MEDICTIONS:  ____________________________________________________________________________________________________________________________________________________________________________  [facility]  Post Op Pain Management  You will be given a prescription for pain medication in the post-op unit (unless contraindicated Percocet or similar). Fill this prescription on the way home from   your surgery.   We recommend you start by taking 1 Percocet and 1 extra strength Tylenol (over the counter).   4-6 hours later if your pain is not adequately controlled you may take 2 Percocet (NO ADDITIONAL TYLENOL).   DO NOT take more than 4G (4000 mg) of Tylenol in a 24 hour period.   You may also take ibuprofen for additional pain relief. The appropriate dosage for ibuprofen is 10mg/ kg body weight (MAX dose is 800mg). You may take ibuprofen only every 6 hours; or, you may alternate ibuprofen with either 2 Percocet, or 1 Percocet + 1 Tylenol every 3 hours with the weight appropriate dosage of ibuprofen.      [facility]  [address] [place] [address] [address]  [place] [address]  9 a.m. - 4 p.m. Monday - Friday  Phone: [phone]  Fax: [phone]  AFTER HOURS ANSWERING SERVICE:  [phone]   © 2020 [facility] [id number]    May have Tylenol after: 1:30pm    May have Ibuprofen/advil/motrin/aleve after: 11/14 after 7:30am    TO REACH YOUR PHYSICIAN AFTER HOURS  CALL  AND ASK FOR THE PHYSICIAN ON CALL

## 2023-11-13 NOTE — ANESTHESIA POSTPROCEDURE EVALUATION
Patient: Donnell CHIANG Sanabria    Procedure Summary       Date: 11/13/23 Room / Location: [facility] OR 04 / [facility] OR    Anesthesia Start: 0738 Anesthesia Stop: 1031    Procedure: SEPTO-RHINOPLASTY, INFERIOR TURBINATE REDUCTION, REPAIR NASAL VALVES, NASAL ENDOCSOPY (Nose) Diagnosis:       Deviated nasal septum      (Deviated nasal septum [J34.2])    Surgeons: Jonathan K Frankel, MD Responsible Provider: Desean Salazar DO    Anesthesia Type: general ASA Status: 2            Anesthesia Type: general    Vitals Value Taken Time   /82 11/13/23 1145   Temp 36.1 °C (97 °F) 11/13/23 1031   Pulse 82 11/13/23 1145   Resp 18 11/13/23 1145   SpO2 92 % 11/13/23 1145       Anesthesia Post Evaluation    Patient location during evaluation: PACU  Level of consciousness: awake and alert  Pain management: satisfactory to patient  Multimodal analgesia pain management approach  Airway patency: patent  Cardiovascular status: acceptable  Respiratory status: acceptable  Hydration status: acceptable  Comments: No PONV    No notable events documented.

## 2023-11-13 NOTE — H&P
"History Of Present Illness  37y/o male with nasal congestion refractory to use of intranasal steroids  PMH: Bicuspic aortic valve  Budeprion, Metoprolol      Past Medical History  He has a past medical history of Bicuspid aortic valve, Personal history of other mental and behavioral disorders, Personal history of other specified conditions, and Personal history of urinary calculi (03/17/2020).    Surgical History  He has a past surgical history that includes Vasectomy (11/03/2017); Other surgical history (08/24/2022); Other surgical history (03/17/2020); Other surgical history (03/17/2020); and Other surgical history (03/17/2020).     Social History  He reports that he has never smoked. He has never used smokeless tobacco. No history on file for alcohol use and drug use.    Family History  No family history on file.     Allergies  Patient has no known allergies.    Review of Systems     Physical Exam  Constitutional:       General: He is not in acute distress.  Cardiovascular:      Rate and Rhythm: Normal rate.   Pulmonary:      Effort: No respiratory distress.          Last Recorded Vitals  Height 1.753 m (5' 9\"), weight 113 kg (250 lb).    Relevant Results        Scheduled medications    Continuous medications    PRN medications       Assessment/Plan   Active Problems:  There are no active Hospital Problems.      OR for Septoplasty, nasal valve repair,  bilateral inferior turbinate reduction,  grafts and lower lateral crural strut grafts, D/C home after procedure.           Amy Sutton MD    "

## 2023-11-13 NOTE — ANESTHESIA PROCEDURE NOTES
Airway  Date/Time: 11/13/2023 7:44 AM  Urgency: elective    Airway not difficult    Staffing  Performed: CRNA   Authorized by: Desean Salazar DO    Performed by: NAV Smith-CHIDI  Patient location during procedure: OR    Indications and Patient Condition  Indications for airway management: anesthesia  Spontaneous Ventilation: absent  Sedation level: deep  Preoxygenated: yes  Patient position: sniffing  Mask difficulty assessment: 1 - vent by mask    Final Airway Details  Final airway type: endotracheal airway      Successful airway: ETT and CHINTAN tube  Cuffed: yes   Successful intubation technique: direct laryngoscopy  Endotracheal tube insertion site: oral  Blade: Nidia  Blade size: #4  ETT size (mm): 8.0  Cormack-Lehane Classification: grade I - full view of glottis  Placement verified by: chest auscultation and capnometry   Number of attempts at approach: 1

## 2023-11-14 ENCOUNTER — TELEPHONE (OUTPATIENT)
Dept: OTOLARYNGOLOGY | Facility: CLINIC | Age: 38
End: 2023-11-14
Payer: COMMERCIAL

## 2023-11-14 NOTE — TELEPHONE ENCOUNTER
Donnell  is post-op day 1, s/p open-septo-rhinoplasty performed at [facility] on 11/13/23. I called him today to check on the status of his recovery, he c/o mild pain and nasal congestion but states this is tolerable and he is doing well otherwise. He denies any other complication or concern at this time. Patient reminded of post op visit scheduled on  11/20/23. Dr. Frankel updated on patient condition and patient encouraged to call office with any problems or concerns.

## 2023-11-20 ENCOUNTER — OFFICE VISIT (OUTPATIENT)
Dept: OTOLARYNGOLOGY | Facility: CLINIC | Age: 38
End: 2023-11-20
Payer: COMMERCIAL

## 2023-11-20 VITALS
HEIGHT: 69 IN | DIASTOLIC BLOOD PRESSURE: 81 MMHG | WEIGHT: 251 LBS | TEMPERATURE: 97.2 F | BODY MASS INDEX: 37.18 KG/M2 | SYSTOLIC BLOOD PRESSURE: 122 MMHG

## 2023-11-20 DIAGNOSIS — J34.3 HYPERTROPHY OF INFERIOR NASAL TURBINATE: ICD-10-CM

## 2023-11-20 DIAGNOSIS — J34.89 NASAL VALVE STENOSIS: Primary | ICD-10-CM

## 2023-11-20 PROCEDURE — 1036F TOBACCO NON-USER: CPT | Performed by: OTOLARYNGOLOGY

## 2023-11-20 PROCEDURE — 99024 POSTOP FOLLOW-UP VISIT: CPT | Performed by: OTOLARYNGOLOGY

## 2023-11-20 NOTE — PROGRESS NOTES
Patient is following up 1 week following nasal airway surgery.  He is doing well.    The nasal cast and splints are in place.    The Mayer splint suture was cut and the splints were removed.  Remaining sutures were removed.  The nasal dressing was then gently removed.  Nasal secretions were suctioned from bilateral nasal cavities.     The nasal dorsum is straight and midline.  The septum is straight and midline.  Inferior turbinates in the lateralized position.  The columellar incision is healing well.  No evidence of septal perforation on anterior rhinoscopy.  Breathing well through both nasal cavities.  Appropriate edema.    Postoperative instructions were reviewed with the patient.    Patient is happy with his outcomes at this point.  He will follow-up in 3 to 4 weeks, earlier as needed.    Jonathan Frankel, MD  Facial Plastic Surgery  [facility]tol[facility]ology - [facility]    This note was dictated with Dragon Naturally Speaking and may contain some grammatical errors due to limitations of the software.

## 2023-11-20 NOTE — LETTER
November 20, 2023     Patient: Donnell Sanabria   YOB: 1985   Date of Visit: 11/20/2023       To Whom It May Concern:    It is my medical opinion that Donnell Sanabria will return to work without restrictions on 11/26/2023. He underwent nasal obstruction surgery with Dr. Frankel on 11/13/23.     If you have any questions or concerns, please don't hesitate to call.         Sincerely,        Jonathan K Frankel, MD    CC: No Recipients

## 2023-11-27 ENCOUNTER — APPOINTMENT (OUTPATIENT)
Dept: PRIMARY CARE | Facility: CLINIC | Age: 38
End: 2023-11-27
Payer: COMMERCIAL

## 2023-11-28 ENCOUNTER — OFFICE VISIT (OUTPATIENT)
Dept: PRIMARY CARE | Facility: CLINIC | Age: 38
End: 2023-11-28
Payer: COMMERCIAL

## 2023-11-28 VITALS
BODY MASS INDEX: 38.06 KG/M2 | HEART RATE: 90 BPM | DIASTOLIC BLOOD PRESSURE: 80 MMHG | TEMPERATURE: 97 F | SYSTOLIC BLOOD PRESSURE: 132 MMHG | RESPIRATION RATE: 14 BRPM | HEIGHT: 69 IN | OXYGEN SATURATION: 96 % | WEIGHT: 257 LBS

## 2023-11-28 DIAGNOSIS — M79.89 SOFT TISSUE MASS: Primary | ICD-10-CM

## 2023-11-28 DIAGNOSIS — Q23.1 BICUSPID AORTIC VALVE (HHS-HCC): ICD-10-CM

## 2023-11-28 PROCEDURE — 99214 OFFICE O/P EST MOD 30 MIN: CPT | Performed by: INTERNAL MEDICINE

## 2023-11-28 PROCEDURE — 90471 IMMUNIZATION ADMIN: CPT | Performed by: INTERNAL MEDICINE

## 2023-11-28 PROCEDURE — 1036F TOBACCO NON-USER: CPT | Performed by: INTERNAL MEDICINE

## 2023-11-28 PROCEDURE — 90686 IIV4 VACC NO PRSV 0.5 ML IM: CPT | Performed by: INTERNAL MEDICINE

## 2023-11-28 NOTE — PROGRESS NOTES
"Subjective    Donnell Sanabria is a 38 y.o. male who presents for New Patient Visit.  HPI  Has noticed lump on back since August.  It does not itch or hurt.   His wife notices it is getting bigger.  Hx bicuspid aortic valve- sees Dr Andrew.     Review of Systems   All other systems reviewed and are negative.        Objective     /80 (BP Location: Left arm, Patient Position: Sitting, BP Cuff Size: Adult)   Pulse 90   Temp 36.1 °C (97 °F) (Skin)   Resp 14   Ht 1.753 m (5' 9\")   Wt 117 kg (257 lb)   SpO2 96%   BMI 37.95 kg/m²    Physical Exam  Vitals reviewed.   Constitutional:       General: He is not in acute distress.     Appearance: Normal appearance.   Cardiovascular:      Rate and Rhythm: Normal rate and regular rhythm.      Pulses: Normal pulses.      Heart sounds: Normal heart sounds.   Pulmonary:      Effort: Pulmonary effort is normal.      Breath sounds: Normal breath sounds.   Abdominal:      Tenderness: There is no abdominal tenderness.   Musculoskeletal:         General: No swelling.   Skin:     General: Skin is warm and dry.   Neurological:      Mental Status: He is alert.       Health Maintenance Due   Topic Date Due    Yearly Adult Physical  Never done    HIV Screening  Never done    Pneumococcal Vaccine: Pediatrics (0 to 5 Years) and At-Risk Patients (6 to 64 Years) (1 - PCV) Never done    Hepatitis C Screening  Never done    DTaP/Tdap/Td Vaccines (2 - Tdap) 09/15/2004    Varicella Vaccines (1 of 2 - 2-dose childhood series) Never done    Hepatitis A Vaccines (2 of 2 - 2-dose series) 12/22/2007    COVID-19 Vaccine (4 - Pfizer series) 01/19/2022    Influenza Vaccine (1) Never done          Assessment/Plan   Problem List Items Addressed This Visit       Bicuspid aortic valve     Other Visit Diagnoses       Soft tissue mass    -  Primary    left side of back.    Relevant Orders    Referral to Dermatology        Refer to dermatology  Call  with any problems or questions.   Follow up as needed  "

## 2023-12-18 ENCOUNTER — OFFICE VISIT (OUTPATIENT)
Dept: OTOLARYNGOLOGY | Facility: CLINIC | Age: 38
End: 2023-12-18
Payer: COMMERCIAL

## 2023-12-18 VITALS — DIASTOLIC BLOOD PRESSURE: 80 MMHG | TEMPERATURE: 98.2 F | SYSTOLIC BLOOD PRESSURE: 119 MMHG

## 2023-12-18 DIAGNOSIS — J34.89 NASAL OBSTRUCTION: Primary | ICD-10-CM

## 2023-12-18 PROCEDURE — 99024 POSTOP FOLLOW-UP VISIT: CPT | Performed by: OTOLARYNGOLOGY

## 2023-12-18 PROCEDURE — 1036F TOBACCO NON-USER: CPT | Performed by: OTOLARYNGOLOGY

## 2023-12-18 NOTE — PROGRESS NOTES
Patient is following up about 1 month from her nasal airway surgery.  Patient states he is overall doing well.  Reports dryness of his nose.  Breathing well through both sides of his nose.        The nasal dorsum is straight and midline.  The septum is straight and midline.  Inferior turbinates in the lateralized position.  The columellar incision is healing well.  No evidence of septal perforation on anterior rhinoscopy.  Breathing well through both nasal cavities.  Appropriate edema.    Postoperative instructions were reviewed with the patient.  He will follow-up in 3 to 4 months, earlier as needed.  Patient agrees with the plan.

## 2024-01-24 ENCOUNTER — OFFICE VISIT (OUTPATIENT)
Dept: UROLOGY | Facility: CLINIC | Age: 39
End: 2024-01-24
Payer: COMMERCIAL

## 2024-01-24 VITALS
TEMPERATURE: 98.1 F | WEIGHT: 253 LBS | HEART RATE: 82 BPM | BODY MASS INDEX: 37.47 KG/M2 | SYSTOLIC BLOOD PRESSURE: 116 MMHG | DIASTOLIC BLOOD PRESSURE: 81 MMHG | HEIGHT: 69 IN

## 2024-01-24 DIAGNOSIS — Z30.09 ENCOUNTER FOR VASECTOMY COUNSELING: Primary | ICD-10-CM

## 2024-01-24 PROCEDURE — 1036F TOBACCO NON-USER: CPT | Performed by: STUDENT IN AN ORGANIZED HEALTH CARE EDUCATION/TRAINING PROGRAM

## 2024-01-24 PROCEDURE — 99204 OFFICE O/P NEW MOD 45 MIN: CPT | Performed by: STUDENT IN AN ORGANIZED HEALTH CARE EDUCATION/TRAINING PROGRAM

## 2024-01-24 RX ORDER — TIRZEPATIDE 5 MG/.5ML
0.5 INJECTION, SOLUTION SUBCUTANEOUS DAILY
COMMUNITY
Start: 2024-01-14

## 2024-01-24 NOTE — PROGRESS NOTES
Donnell presents as a new patient to discuss vasectomy.   The patient’s EMR has been reviewed.  Lives in [place], [place].  Occupation:   Status:   Children: 1x    SUBJECTIVE: HPI   States he underwent vasectomy when he was 26 years old.   Later was  and had the vasectomy reversed in 2018.   Now with 1x healthy child.   May consider additional children in the future.  However, plan to adopt in the case they want more children.   Denies wanting to conceive any further children of his own.    Acknowledges mutual agreement with Partner.  Denies any testicular pain or scrotal swelling.  Denies any urinary issues or sexual health concerns.   IPSS - 3  BHARATH - 25    Denies any significant PMH  Denies any other pertinent PSH.  Denies any FH of  malignancy.  Non-smoker.     Past medical, surgical, family and social history in the chart was reviewed and is accurate including any additions to what is in this HPI.    Review of Systems   Constitutional: denies any unintentional weight loss or change in strength.  Integumentary: denies any rashes or pruritus.  Eyes: denies any double vision or eye pain.  Ear/Nose/Mouth/Throat: denies any nosebleeds or gum bleeds.  Cardiovascular: denies any chest pain or syncope.  Respiratory: denies hemoptysis.  Gastrointestinal: denies nausea or vomiting.  Musculoskeletal: denies muscle cramping or weakness.  Neurologic: denies convulsions or seizures.  Hematologic/Lymphatic: denies bleeding tendencies.  Endocrine: denies heat/cold intolerance.  All other systems have been reviewed and are negative unless otherwise noted in the HPI.    OBJECTIVE:  Visit Vitals  /81 (BP Location: Right arm, Patient Position: Sitting, BP Cuff Size: Large adult)   Pulse 82   Temp 36.7 °C (98.1 °F) (Temporal)     Physical Exam   Constitutional: No obvious distress.  Eyes: Non-injected conjunctiva, sclera clear, EOMI.  Ears/Nose/Mouth/Throat: No obvious drainage per ears or  nose.  Cardiovascular: Extremities are warm and well perfused. No edema, cyanosis or pallor.  Respiratory: No audible wheezing/stridor; respirations do not appear labored.  Gastrointestinal: Abdomen soft, not distended.  Musculoskeletal: Normal ROM of extremities.  Skin: No obvious rashes or open sores.  Neurologic: Alert and oriented, CN 2-12 grossly intact.  Psychiatric: Answers questions appropriately with normal affect.  Hematologic/Lymphatic/Immunologic: No obvious bruises or sites of spontaneous bleeding.  Genitourinary: No CVA tenderness, bladder not palpable.   Vasa palpably normal bilaterally proximal to the site of previous repair    ASSESSMENT/PLAN:  Problem List Items Addressed This Visit    None  Visit Diagnoses       Encounter for vasectomy counseling    -  Primary    Relevant Orders    Follow Up In Urology           Vasectomy Counseling:    Vasectomy is intended to be a permanent form of contraception.  Vasectomy does not produce immediate sterility.  Following vasectomy, another form of contraception is required until vas occlusion is confirmed by post-vasectomy semen analysis (PVSA).  Even after vas occlusion is confirmed, vasectomy is not 100% reliable in preventing pregnancy.  The risk of pregnancy after vasectomy is approximately 1 in 2,000 for men who have post-vasectomy azoospermia or PVSA showing rare non-motile sperm (RNMS).  Repeat vasectomy is necessary in <1% of vasectomies, provided that a technique for vas occlusion known to have a low occlusive failure rate has been used.  Patient should refrain from ejaculation for approximately one week after vasectomy.  Options for fertility after vasectomy reversal and sperm retrieval with in vitro fertilization. These options are not always successful, and they may be expensive.    Patient understand that he must have protected intercourse after the procedure (vasectomy) to the point where he is able to provide a negative semen sample. He may stop  using other methods of ocntraception when examination of one well-mixed, uncentrifuged, fresh post-vasectomy semen specimen shows azoospermia or only rare non-motile sperm (RNMS or <100,000 non-motile sperm/mL).    Will proceed with vasectomy in clinic under local.  Rx for valium sent to the patient pharmacy, to be taken morning of the procedure.   All questions were answered to the patient’s satisfaction.  Patient agrees with the plan and wishes to proceed.  Tentative procedural date of 04/18/24.    Scribed for Dr. Richard Benz by Alberto Phoenix.  I, Dr. Richard Benz, have personally reviewed and agreed with the information entered by the Virtual Scribe. 01/24/24

## 2024-04-15 ENCOUNTER — OFFICE VISIT (OUTPATIENT)
Dept: OTOLARYNGOLOGY | Facility: CLINIC | Age: 39
End: 2024-04-15
Payer: COMMERCIAL

## 2024-04-15 VITALS — DIASTOLIC BLOOD PRESSURE: 79 MMHG | TEMPERATURE: 97.8 F | SYSTOLIC BLOOD PRESSURE: 130 MMHG

## 2024-04-15 DIAGNOSIS — J34.2 NASAL SEPTAL DEVIATION: Primary | ICD-10-CM

## 2024-04-15 DIAGNOSIS — J34.89 NASAL VALVE STENOSIS: ICD-10-CM

## 2024-04-15 DIAGNOSIS — R09.81 NASAL CONGESTION: ICD-10-CM

## 2024-04-15 PROCEDURE — 99212 OFFICE O/P EST SF 10 MIN: CPT | Performed by: OTOLARYNGOLOGY

## 2024-04-15 PROCEDURE — 1036F TOBACCO NON-USER: CPT | Performed by: OTOLARYNGOLOGY

## 2024-04-15 NOTE — PROGRESS NOTES
Patient is following up rom nasal airway surgery.  Patient states he is overall doing well.  Reports dryness of his nose has resolved.  Breathing well through both sides of his nose.  No other complaints at this time        The nasal dorsum is straight and midline.  The septum is straight and midline.  Inferior turbinates in the lateralized position.  The columellar incision is well-healed.  No evidence of septal perforation on anterior rhinoscopy.  Breathing well through both nasal cavities.  Appropriate edema.    Postoperative instructions were reviewed with the patient.    Photodocumentation was performed.    Follow-up as needed.    Jonathan Frankel, MD  Facial Plastic Surgery  [facility]tolaryngology - [facility]    This note was dictated with Dragon Naturally Speaking and may contain some grammatical errors due to limitations of the software.

## 2024-04-18 ENCOUNTER — APPOINTMENT (OUTPATIENT)
Dept: UROLOGY | Facility: CLINIC | Age: 39
End: 2024-04-18
Payer: COMMERCIAL

## 2024-05-16 ENCOUNTER — PROCEDURE VISIT (OUTPATIENT)
Dept: UROLOGY | Facility: CLINIC | Age: 39
End: 2024-05-16
Payer: COMMERCIAL

## 2024-05-16 VITALS
TEMPERATURE: 97.2 F | SYSTOLIC BLOOD PRESSURE: 114 MMHG | WEIGHT: 230 LBS | HEART RATE: 76 BPM | DIASTOLIC BLOOD PRESSURE: 78 MMHG | BODY MASS INDEX: 33.97 KG/M2

## 2024-05-16 DIAGNOSIS — Z30.2 ENCOUNTER FOR VASECTOMY: ICD-10-CM

## 2024-05-16 DIAGNOSIS — Z98.52 S/P VASECTOMY: ICD-10-CM

## 2024-05-16 PROCEDURE — 55250 REMOVAL OF SPERM DUCT(S): CPT | Performed by: STUDENT IN AN ORGANIZED HEALTH CARE EDUCATION/TRAINING PROGRAM

## 2024-05-16 ASSESSMENT — PAIN SCALES - GENERAL: PAINLEVEL: 0-NO PAIN

## 2025-01-14 ENCOUNTER — APPOINTMENT (OUTPATIENT)
Dept: PRIMARY CARE | Facility: CLINIC | Age: 40
End: 2025-01-14
Payer: COMMERCIAL

## 2025-01-14 VITALS
BODY MASS INDEX: 33.33 KG/M2 | HEART RATE: 81 BPM | SYSTOLIC BLOOD PRESSURE: 111 MMHG | DIASTOLIC BLOOD PRESSURE: 77 MMHG | OXYGEN SATURATION: 96 % | WEIGHT: 225 LBS | HEIGHT: 69 IN

## 2025-01-14 DIAGNOSIS — E78.5 HYPERLIPIDEMIA, UNSPECIFIED HYPERLIPIDEMIA TYPE: ICD-10-CM

## 2025-01-14 DIAGNOSIS — F41.8 DEPRESSION WITH ANXIETY: ICD-10-CM

## 2025-01-14 DIAGNOSIS — Q23.81 BICUSPID AORTIC VALVE: ICD-10-CM

## 2025-01-14 DIAGNOSIS — Z71.85 IMMUNIZATION COUNSELING: ICD-10-CM

## 2025-01-14 DIAGNOSIS — Z13.9 SCREENING FOR CONDITION: ICD-10-CM

## 2025-01-14 DIAGNOSIS — E55.9 VITAMIN D DEFICIENCY: ICD-10-CM

## 2025-01-14 DIAGNOSIS — Z79.899 DRUG THERAPY: ICD-10-CM

## 2025-01-14 DIAGNOSIS — E66.811 CLASS 1 OBESITY WITHOUT SERIOUS COMORBIDITY WITH BODY MASS INDEX (BMI) OF 33.0 TO 33.9 IN ADULT, UNSPECIFIED OBESITY TYPE: ICD-10-CM

## 2025-01-14 DIAGNOSIS — F43.10 PTSD (POST-TRAUMATIC STRESS DISORDER): ICD-10-CM

## 2025-01-14 DIAGNOSIS — Z00.00 PREVENTATIVE HEALTH CARE: Primary | ICD-10-CM

## 2025-01-14 DIAGNOSIS — E29.1 HYPOGONADISM IN MALE: ICD-10-CM

## 2025-01-14 DIAGNOSIS — R73.03 PREDIABETES: ICD-10-CM

## 2025-01-14 PROCEDURE — 1036F TOBACCO NON-USER: CPT | Performed by: FAMILY MEDICINE

## 2025-01-14 PROCEDURE — 99215 OFFICE O/P EST HI 40 MIN: CPT | Performed by: FAMILY MEDICINE

## 2025-01-14 PROCEDURE — 3008F BODY MASS INDEX DOCD: CPT | Performed by: FAMILY MEDICINE

## 2025-01-14 PROCEDURE — 99417 PROLNG OP E/M EACH 15 MIN: CPT | Performed by: FAMILY MEDICINE

## 2025-01-14 PROCEDURE — 99395 PREV VISIT EST AGE 18-39: CPT | Performed by: FAMILY MEDICINE

## 2025-01-14 RX ORDER — BUPROPION HYDROCHLORIDE 300 MG/1
300 TABLET ORAL EVERY MORNING
Start: 2025-01-14

## 2025-01-14 NOTE — PROGRESS NOTES
"Subjective   Patient ID: Donnell Sanabria is a 39 y.o. male who presents for Establish Care (Pt in today to establish care / discuss weight loss).    Review of Systems  Denies N/V/D/C, no HA/S/V, denies rashes/lesions, no CP/SOB. Denies fevers/chills.  All other systems were negative.     Objective   /77 (BP Location: Left arm, Patient Position: Sitting, BP Cuff Size: Large adult)   Pulse 81   Ht 1.753 m (5' 9\")   Wt 102 kg (225 lb)   SpO2 96%   BMI 33.23 kg/m²     Physical Exam  Gen: NAD  eyes: EOMI, PERRLA  ENT: hearing grossly intact, no nasal discharge  resp: CTABL, without R/R  heart: RRR without MRG  GI: abd: S/ND/NT, BS+  lymph: no axillary, cervical, supraclavicular lymphadenopathy noted   MS: gait grossly WNL,  derm: no rashes or lesions noted  neuro: CN II-XII grossly intact  psych: A&Ox3    Assessment/Plan   Problem List Items Addressed This Visit             ICD-10-CM    Bicuspid aortic valve Q23.81    Hyperlipidemia E78.5    Relevant Orders    Lipid Panel    Vitamin D deficiency E55.9    Relevant Orders    Vitamin D 25-Hydroxy,Total (for eval of Vitamin D levels)    Hypogonadism in male E29.1    Relevant Orders    Estrogens, Total    Testosterone,Free and Total    Progesterone    Dihydrotestosterone    Cortisol    DHEA-Sulfate    Prostate Specific Antigen    Estradiol    Drug therapy Z79.899    Relevant Orders    Prostate Specific Antigen    CBC and Auto Differential    Comprehensive Metabolic Panel    Magnesium    Urinalysis with Reflex Microscopic    Screening for condition Z13.9    Relevant Orders    TSH with reflex to Free T4 if abnormal    Immunization counseling Z71.85    Preventative health care - Primary Z00.00    Class 1 obesity without serious comorbidity with body mass index (BMI) of 33.0 to 33.9 in adult E66.811, Z68.33    PTSD (post-traumatic stress disorder) F43.10    Relevant Medications    buPROPion XL (Wellbutrin XL) 300 mg 24 hr tablet    Prediabetes R73.03    Relevant Orders "    Hemoglobin A1C    Depression with anxiety F41.8    Relevant Medications    buPROPion XL (Wellbutrin XL) 300 mg 24 hr tablet       Patient here today to establish.  Will do annual preventative visit, plan labs, discussed weight management.    ------    Immunization counseling: Up-to-date on annual flu shot and latest coronavirus booster.  Most recent Tdap around 2017. -->> Check with your pharmacy to keep up-to-date on immunizations.    Screening for hep C will be done before next appt.    -------    Obesity, BMI 33.  Down about 30 pounds over the last year.  On Mounjaro (with diagnosis of prediabetes, obesity), currently at 15 mg/week dosing.  Would like to try cutting back to a lower dose, not been taking injections weekly but spreading it out. -->> Keep up the excellent work.  Can refill Mounjaro as needed.  Will discuss at next appointment.      Regarding previous labs:    -Hyperlipidemia, LDL is 131, goal is less than 100.  Total to good ratio was 5.2, goal is less than 3.4, over 5 counts as high risk. -->>  Rechecking fasting lipid panel next time we do labs.    -Hypogonadism, fatigue, some erectile dysfunction, total testosterone was 233 July 2024.  He did discuss to compounding pharmacy -->>  Will order male hormone panel to do with next labs.    -Vitamin D deficiency, no vitamin D noted on the chart. -->>  Will be checking with the labs we ordered today.  We did discuss starting vitamin D3, 10,000 units / 250 mcg.,    - Prediabetes, A1c was 5.6, before that was 5.7.    Depression with anxiety, PTSD.  On bupropion 300 mg daily.  Feels it is helping but not very much.  Seeing psychiatry.  They are discussing switching meds. Is also doing meditation.  -->> Follow-up as planned.    bicuspid aortic valve, sees cardiology/[facility], gets an annual echo.  On metoprolol. -->>  Follow-up as planned.      - Will schedule virtual appointment in about 2 weeks to go over labs.  - Patient will go to [facility] in the next several days first thing in the morning to get fasting annual labs including male hormone panel and hepatitis C screening.

## 2025-01-14 NOTE — PATIENT INSTRUCTIONS
Patient here today to establish.  Will do annual preventative visit, plan labs, discussed weight management.    ------    Immunization counseling: Up-to-date on annual flu shot and latest coronavirus booster.  Most recent Tdap around 2017. -->> Check with your pharmacy to keep up-to-date on immunizations.    Screening for hep C will be done before next appt.    -------    Obesity, BMI 33.  Down about 30 pounds over the last year.  On Mounjaro, currently at 15 mg/week dosing. -->> Keep up the excellent work.      Regarding previous labs:    -Hyperlipidemia, LDL is 131, goal is less than 100.  Total to good ratio was 5.2, goal is less than 3.4, over 5 counts as high risk. -->>  Rechecking fasting lipid panel next time we do labs.    -Hypogonadism, fatigue, some erectile dysfunction, total testosterone was 233 July 2024.  He did discuss to compounding pharmacy -->>  Will order male hormone panel to do with next labs.    -Vitamin D deficiency, no vitamin D noted on the chart. -->>  Will be checking with the labs we ordered today.  We did discuss starting vitamin D3, 10,000 units / 250 mcg.,    - Prediabetes, A1c was 5.6, before that was 5.7.    Depression with anxiety, PTSD.  On bupropion 300 mg daily.  Feels it is helping but not very much.  Seeing psychiatry.  They are discussing switching meds. Is also doing meditation.  -->> Follow-up as planned.    bicuspid aortic valve, sees cardiology/[facility], gets an annual echo.  On metoprolol. -->>  Follow-up as planned.  Patient here today to establish.  Will do annual preventative visit, plan labs, discussed weight management.    ------    Immunization counseling: Up-to-date on annual flu shot and latest coronavirus booster.  Most recent Tdap around 2017. -->> Check with your pharmacy to keep up-to-date on immunizations.    Screening for hep C will be done before next appt.    -------    Obesity, BMI 33.  Down about 30 pounds over the last year.  On Mounjaro (with  diagnosis of prediabetes, obesity), currently at 15 mg/week dosing.  Would like to try cutting back to a lower dose, not been taking injections weekly but spreading it out. -->> Keep up the excellent work.  Can refill Mounjaro as needed.  Will discuss at next appointment.      Regarding previous labs:    -Hyperlipidemia, LDL is 131, goal is less than 100.  Total to good ratio was 5.2, goal is less than 3.4, over 5 counts as high risk. -->>  Rechecking fasting lipid panel next time we do labs.    -Hypogonadism, fatigue, some erectile dysfunction, total testosterone was 233 July 2024.  [facility] -->>  Will order male hormone panel to do with next labs.    -Vitamin D deficiency, no vitamin D noted on the chart. -->>  Will be checking with the labs we ordered today.  We did discuss starting vitamin D3, 10,000 units / 250 mcg.,    - Prediabetes, A1c was 5.6, before that was 5.7.    Depression with anxiety, PTSD.  On bupropion 300 mg daily.  Feels it is helping but not very much.  Seeing psychiatry.  They are discussing switching meds. Is also doing meditation.  -->> Follow-up as planned.    bicuspid aortic valve, sees cardiology/[facility], gets an annual echo.  On metoprolol. -->>  Follow-up as planned.      - Will schedule virtual appointment in about 2 weeks to go over labs.  - Patient will go to [facility] in the next several days first thing in the morning to get fasting annual labs including male hormone panel and hepatitis C screening.    - Will schedule virtual appointment in about 2 weeks to go over labs.  - Patient will go to [facility] in the next several days first thing in the morning to get fasting annual labs including male hormone panel and hepatitis C screening.

## 2025-01-20 ENCOUNTER — LAB (OUTPATIENT)
Dept: LAB | Facility: LAB | Age: 40
End: 2025-01-20
Payer: COMMERCIAL

## 2025-01-20 DIAGNOSIS — E29.1 HYPOGONADISM IN MALE: ICD-10-CM

## 2025-01-20 DIAGNOSIS — E78.5 HYPERLIPIDEMIA, UNSPECIFIED HYPERLIPIDEMIA TYPE: ICD-10-CM

## 2025-01-20 DIAGNOSIS — Z13.9 SCREENING FOR CONDITION: ICD-10-CM

## 2025-01-20 DIAGNOSIS — R73.03 PREDIABETES: ICD-10-CM

## 2025-01-20 DIAGNOSIS — Z79.899 DRUG THERAPY: ICD-10-CM

## 2025-01-20 DIAGNOSIS — E55.9 VITAMIN D DEFICIENCY: ICD-10-CM

## 2025-01-20 LAB
25(OH)D3 SERPL-MCNC: 18 NG/ML (ref 30–100)
ALBUMIN SERPL BCP-MCNC: 5 G/DL (ref 3.4–5)
ALP SERPL-CCNC: 57 U/L (ref 33–120)
ALT SERPL W P-5'-P-CCNC: 27 U/L (ref 10–52)
ANION GAP SERPL CALC-SCNC: 9 MMOL/L (ref 10–20)
APPEARANCE UR: CLEAR
AST SERPL W P-5'-P-CCNC: 16 U/L (ref 9–39)
BASOPHILS # BLD AUTO: 0.03 X10*3/UL (ref 0–0.1)
BASOPHILS NFR BLD AUTO: 0.5 %
BILIRUB SERPL-MCNC: 0.8 MG/DL (ref 0–1.2)
BILIRUB UR STRIP.AUTO-MCNC: NEGATIVE MG/DL
BUN SERPL-MCNC: 12 MG/DL (ref 6–23)
CALCIUM SERPL-MCNC: 9.5 MG/DL (ref 8.6–10.3)
CHLORIDE SERPL-SCNC: 105 MMOL/L (ref 98–107)
CHOLEST SERPL-MCNC: 198 MG/DL (ref 0–199)
CHOLESTEROL/HDL RATIO: 5
CO2 SERPL-SCNC: 29 MMOL/L (ref 21–32)
COLOR UR: NORMAL
CORTIS SERPL-MCNC: 13.6 UG/DL (ref 2.5–20)
CREAT SERPL-MCNC: 0.97 MG/DL (ref 0.5–1.3)
DHEA-S SERPL-MCNC: 340 UG/DL (ref 95–530)
EGFRCR SERPLBLD CKD-EPI 2021: >90 ML/MIN/1.73M*2
EOSINOPHIL # BLD AUTO: 0.05 X10*3/UL (ref 0–0.7)
EOSINOPHIL NFR BLD AUTO: 0.9 %
ERYTHROCYTE [DISTWIDTH] IN BLOOD BY AUTOMATED COUNT: 12.3 % (ref 11.5–14.5)
EST. AVERAGE GLUCOSE BLD GHB EST-MCNC: 100 MG/DL
ESTRADIOL SERPL-MCNC: 33 PG/ML
GLUCOSE SERPL-MCNC: 89 MG/DL (ref 74–99)
GLUCOSE UR STRIP.AUTO-MCNC: NORMAL MG/DL
HBA1C MFR BLD: 5.1 %
HCT VFR BLD AUTO: 45.8 % (ref 41–52)
HDLC SERPL-MCNC: 40 MG/DL
HGB BLD-MCNC: 15.4 G/DL (ref 13.5–17.5)
IMM GRANULOCYTES # BLD AUTO: 0.01 X10*3/UL (ref 0–0.7)
IMM GRANULOCYTES NFR BLD AUTO: 0.2 % (ref 0–0.9)
KETONES UR STRIP.AUTO-MCNC: NEGATIVE MG/DL
LDLC SERPL CALC-MCNC: 130 MG/DL
LEUKOCYTE ESTERASE UR QL STRIP.AUTO: NEGATIVE
LYMPHOCYTES # BLD AUTO: 2.25 X10*3/UL (ref 1.2–4.8)
LYMPHOCYTES NFR BLD AUTO: 40.3 %
MAGNESIUM SERPL-MCNC: 2.08 MG/DL (ref 1.6–2.4)
MCH RBC QN AUTO: 29.8 PG (ref 26–34)
MCHC RBC AUTO-ENTMCNC: 33.6 G/DL (ref 32–36)
MCV RBC AUTO: 89 FL (ref 80–100)
MONOCYTES # BLD AUTO: 0.34 X10*3/UL (ref 0.1–1)
MONOCYTES NFR BLD AUTO: 6.1 %
NEUTROPHILS # BLD AUTO: 2.91 X10*3/UL (ref 1.2–7.7)
NEUTROPHILS NFR BLD AUTO: 52 %
NITRITE UR QL STRIP.AUTO: NEGATIVE
NON HDL CHOLESTEROL: 158 MG/DL (ref 0–149)
NRBC BLD-RTO: 0 /100 WBCS (ref 0–0)
PH UR STRIP.AUTO: 5.5 [PH]
PLATELET # BLD AUTO: 272 X10*3/UL (ref 150–450)
POTASSIUM SERPL-SCNC: 4.1 MMOL/L (ref 3.5–5.3)
PROGEST SERPL-MCNC: 0.4 NG/ML
PROT SERPL-MCNC: 7.2 G/DL (ref 6.4–8.2)
PROT UR STRIP.AUTO-MCNC: NEGATIVE MG/DL
PSA SERPL-MCNC: 0.51 NG/ML
RBC # BLD AUTO: 5.16 X10*6/UL (ref 4.5–5.9)
RBC # UR STRIP.AUTO: NEGATIVE /UL
SODIUM SERPL-SCNC: 139 MMOL/L (ref 136–145)
SP GR UR STRIP.AUTO: 1.02
TRIGL SERPL-MCNC: 139 MG/DL (ref 0–149)
TSH SERPL-ACNC: 3.91 MIU/L (ref 0.44–3.98)
UROBILINOGEN UR STRIP.AUTO-MCNC: NORMAL MG/DL
VLDL: 28 MG/DL (ref 0–40)
WBC # BLD AUTO: 5.6 X10*3/UL (ref 4.4–11.3)

## 2025-01-20 PROCEDURE — 80061 LIPID PANEL: CPT

## 2025-01-20 PROCEDURE — 80053 COMPREHEN METABOLIC PANEL: CPT

## 2025-01-20 PROCEDURE — 82642 DIHYDROTESTOSTERONE: CPT

## 2025-01-20 PROCEDURE — 84144 ASSAY OF PROGESTERONE: CPT

## 2025-01-20 PROCEDURE — 82672 ASSAY OF ESTROGEN: CPT

## 2025-01-20 PROCEDURE — 36415 COLL VENOUS BLD VENIPUNCTURE: CPT

## 2025-01-20 PROCEDURE — 81003 URINALYSIS AUTO W/O SCOPE: CPT

## 2025-01-20 PROCEDURE — 82306 VITAMIN D 25 HYDROXY: CPT

## 2025-01-20 PROCEDURE — 84153 ASSAY OF PSA TOTAL: CPT

## 2025-01-20 PROCEDURE — 84402 ASSAY OF FREE TESTOSTERONE: CPT

## 2025-01-20 PROCEDURE — 85025 COMPLETE CBC W/AUTO DIFF WBC: CPT

## 2025-01-20 PROCEDURE — 82627 DEHYDROEPIANDROSTERONE: CPT

## 2025-01-20 PROCEDURE — 83735 ASSAY OF MAGNESIUM: CPT

## 2025-01-20 PROCEDURE — 82670 ASSAY OF TOTAL ESTRADIOL: CPT

## 2025-01-20 PROCEDURE — 82533 TOTAL CORTISOL: CPT

## 2025-01-20 PROCEDURE — 83036 HEMOGLOBIN GLYCOSYLATED A1C: CPT

## 2025-01-20 PROCEDURE — 84443 ASSAY THYROID STIM HORMONE: CPT

## 2025-01-24 LAB
ANDROSTANOLONE SERPL-MCNC: 194.4 PG/ML (ref 106–719)
ESTROGEN SERPL-MCNC: 68 PG/ML (ref 56–213)

## 2025-01-26 LAB
TESTOSTERONE FREE (CHAN): 72.1 PG/ML (ref 35–155)
TESTOSTERONE,TOTAL,LC-MS/MS: 303 NG/DL (ref 250–1100)

## 2025-01-28 ENCOUNTER — APPOINTMENT (OUTPATIENT)
Dept: PRIMARY CARE | Facility: CLINIC | Age: 40
End: 2025-01-28
Payer: COMMERCIAL

## 2025-01-28 DIAGNOSIS — R73.03 PREDIABETES: ICD-10-CM

## 2025-01-28 DIAGNOSIS — E78.5 HYPERLIPIDEMIA, UNSPECIFIED HYPERLIPIDEMIA TYPE: ICD-10-CM

## 2025-01-28 DIAGNOSIS — Z79.899 DRUG THERAPY: ICD-10-CM

## 2025-01-28 DIAGNOSIS — Q23.81 BICUSPID AORTIC VALVE: ICD-10-CM

## 2025-01-28 DIAGNOSIS — E55.9 VITAMIN D DEFICIENCY: ICD-10-CM

## 2025-01-28 DIAGNOSIS — F41.8 DEPRESSION WITH ANXIETY: ICD-10-CM

## 2025-01-28 DIAGNOSIS — F43.10 PTSD (POST-TRAUMATIC STRESS DISORDER): ICD-10-CM

## 2025-01-28 DIAGNOSIS — E66.811 CLASS 1 OBESITY WITHOUT SERIOUS COMORBIDITY WITH BODY MASS INDEX (BMI) OF 33.0 TO 33.9 IN ADULT, UNSPECIFIED OBESITY TYPE: ICD-10-CM

## 2025-01-28 DIAGNOSIS — E29.1 HYPOGONADISM IN MALE: Primary | ICD-10-CM

## 2025-01-28 PROCEDURE — 99215 OFFICE O/P EST HI 40 MIN: CPT | Performed by: FAMILY MEDICINE

## 2025-01-28 RX ORDER — TIRZEPATIDE 7.5 MG/.5ML
7.5 INJECTION, SOLUTION SUBCUTANEOUS WEEKLY
Qty: 2 ML | Refills: 3 | Status: SHIPPED | OUTPATIENT
Start: 2025-01-28

## 2025-01-28 NOTE — PROGRESS NOTES
Subjective   Patient ID: Donnell Sanabria is a 39 y.o. male who presents for Virtual Visit (Pt being seen virtually for lab review FU).    Review of Systems  Denies N/V/D/C, no HA/S/V, denies rashes/lesions, no CP/SOB. Denies fevers/chills.  All other systems were negative.     Objective   There were no vitals taken for this visit.    Physical Exam  Gen: NAD  eyes: EOMI, PERRLA  ENT: hearing grossly intact, no nasal discharge  resp: CTABL, without R/R  heart: RRR without MRG  GI: abd: S/ND/NT, BS+  lymph: no axillary, cervical, supraclavicular lymphadenopathy noted   MS: gait grossly WNL,  derm: no rashes or lesions noted  neuro: CN II-XII grossly intact  psych: A&Ox3    Assessment/Plan   Problem List Items Addressed This Visit             ICD-10-CM    Bicuspid aortic valve Q23.81    Hyperlipidemia E78.5    Relevant Medications    tirzepatide (Mounjaro) 7.5 mg/0.5 mL pen injector    Vitamin D deficiency E55.9    Hypogonadism in male - Primary E29.1    Drug therapy Z79.899    Class 1 obesity without serious comorbidity with body mass index (BMI) of 33.0 to 33.9 in adult E66.811, Z68.33    Relevant Medications    tirzepatide (Mounjaro) 7.5 mg/0.5 mL pen injector    PTSD (post-traumatic stress disorder) F43.10    Prediabetes R73.03    Relevant Medications    tirzepatide (Mounjaro) 7.5 mg/0.5 mL pen injector    Depression with anxiety F41.8     Virtual or Telephone Consent    An interactive audio and video telecommunication system which permits real time communications between the patient (at the originating site) and provider (at the distant site) was utilized to provide this telehealth service.   Verbal consent was requested and obtained from Donnell Sanabria on this date, 01/28/25 for a telehealth visit.        annual lab review, weight management, discussing hypogonadism     ------    Immunization counseling: Up-to-date on annual flu shot and latest coronavirus booster.  Most recent Tdap around 2017. -->> Check with  your pharmacy to keep up-to-date on immunizations.    Screening for hep C will be done before next appt.    Screening for prostate cancer: Not really do screening, had PSA tested for hormone panel, totally normal at 0.51.  -------    Obesity, BMI 33.  Weight today but was down about 30 pounds over the last year.  On Mounjaro (with diagnosis of prediabetes, obesity), currently at 15 mg/week dosing only taking it every other week, not necessarily noticing problems but felt it was increased to faster would like to be on a lower dose.  -->> Keep up the excellent work.  Will refill Mounjaro today at 7.5 mg dose.  Around the third week or so if you would like it increased or the dosage changed, call us and let us know so we can send in a new refill.       New annual labs reviewed:    July: Vitamin D, lipid panel      -Hyperlipidemia, LDL is 130, goal is less than 100.  Total to good ratio was 5.2, goal is less than 3.4, over 5 counts as high risk. -->> Avoid fast food, use Olive, canola, and avocado oil. Avoid hydrogenated oils like Crisco or margarine. Avoid simple carbohydrates like bread, potatoes, rice, sugar. Can eat more fish, especially fatty finish like salmon. Will recheck lipid panel in 3 --12 months.      - Hypogonadism, fatigue, some erectile dysfunction, total testosterone was 233 July 2024.  We did discuss compounding pharmacy. -->>  Will fax labs to [facility].  Patient will contact and to discuss arranging an appointment and follow-up and to verify they got the labs.      -Vitamin D deficiency, vitamin D was 18, goal is .  In the last week or 2 patient has started taking vitamin D3, 10,000 units / 250 mcg daily. -->>  Continue current dosing of vitamin D.  Recheck around July.      - Prediabetes, A1c 5.1, was 5.6, before that was 5.7.  Is eating better, also on Mounjaro. -->>  Keep up the excellent work.          Depression with anxiety, PTSD.  On bupropion 300 mg daily.  Feels it is helping but not  very much.  Seeing psychiatry.  They are discussing switching meds. Is also doing meditation.  -->> Follow-up as planned.    bicuspid aortic valve, sees cardiology/[facility] Andrew, gets an annual echo.  On metoprolol. -->>  Follow-up as planned.    (usually gets labs done at [facility])  (Will order hep C screening next time we do labs)    - Will fax patient's labs to [facility] pharmacy.   -Will come back in about 3 months for weight management, follow-up, lab planning.

## 2025-01-28 NOTE — PATIENT INSTRUCTIONS
annual lab review, weight management, discussing hypogonadism     ------    Immunization counseling: Up-to-date on annual flu shot and latest coronavirus booster.  Most recent Tdap around 2017. -->> Check with your pharmacy to keep up-to-date on immunizations.    Screening for hep C will be done before next appt.    Screening for prostate cancer: Not really do screening, had PSA tested for hormone panel, totally normal at 0.51.  -------    Obesity, BMI 33.  Weight today but was down about 30 pounds over the last year.  On Mounjaro (with diagnosis of prediabetes, obesity), currently at 15 mg/week dosing only taking it every other week, not necessarily noticing problems but felt it was increased to faster would like to be on a lower dose.  -->> Keep up the excellent work.  Will refill Mounjaro today at 7.5 mg dose.  Around the third week or so if you would like it increased or the dosage changed, call us and let us know so we can send in a new refill.       New annual labs reviewed:    July: Vitamin D, lipid panel      -Hyperlipidemia, LDL is 130, goal is less than 100.  Total to good ratio was 5.2, goal is less than 3.4, over 5 counts as high risk. -->> Avoid fast food, use Olive, canola, and avocado oil. Avoid hydrogenated oils like Crisco or margarine. Avoid simple carbohydrates like bread, potatoes, rice, sugar. Can eat more fish, especially fatty finish like salmon. Will recheck lipid panel in 3 --12 months.      - Hypogonadism, fatigue, some erectile dysfunction, total testosterone was 233 July 2024.  We did discuss compounding pharmacy. -->>  Will fax labs to [facility].  Patient will contact and to discuss arranging an appointment and follow-up and to verify they got the labs.      -Vitamin D deficiency, vitamin D was 18, goal is .  In the last week or 2 patient has started taking vitamin D3, 10,000 units / 250 mcg daily. -->>  Continue current dosing of vitamin D.  Recheck around July.      - Prediabetes,  A1c 5.1, was 5.6, before that was 5.7.  Is eating better, also on Mounjaro. -->>  Keep up the excellent work.          Depression with anxiety, PTSD.  On bupropion 300 mg daily.  Feels it is helping but not very much.  Seeing psychiatry.  They are discussing switching meds. Is also doing meditation.  -->> Follow-up as planned.    bicuspid aortic valve, sees cardiology/[facility] Andrew, gets an annual echo.  On metoprolol. -->>  Follow-up as planned.    (usually gets labs done at [facility])  (Will order hep C screening next time we do labs)    - Will fax patient's labs to [facility] pharmacy.   -Will come back in about 3 months for weight management, follow-up, lab planning.

## 2025-06-03 ENCOUNTER — OFFICE VISIT (OUTPATIENT)
Dept: PRIMARY CARE | Facility: CLINIC | Age: 40
End: 2025-06-03
Payer: COMMERCIAL

## 2025-06-03 VITALS
HEIGHT: 69 IN | HEART RATE: 91 BPM | SYSTOLIC BLOOD PRESSURE: 118 MMHG | BODY MASS INDEX: 33.92 KG/M2 | OXYGEN SATURATION: 97 % | WEIGHT: 229 LBS | DIASTOLIC BLOOD PRESSURE: 83 MMHG

## 2025-06-03 DIAGNOSIS — I47.11 INAPPROPRIATE SINUS TACHYCARDIA: ICD-10-CM

## 2025-06-03 DIAGNOSIS — E29.1 HYPOGONADISM IN MALE: ICD-10-CM

## 2025-06-03 DIAGNOSIS — Z79.899 DRUG THERAPY: ICD-10-CM

## 2025-06-03 DIAGNOSIS — Z13.9 SCREENING FOR CONDITION: ICD-10-CM

## 2025-06-03 DIAGNOSIS — E78.5 HYPERLIPIDEMIA, UNSPECIFIED HYPERLIPIDEMIA TYPE: Primary | ICD-10-CM

## 2025-06-03 DIAGNOSIS — F41.8 DEPRESSION WITH ANXIETY: ICD-10-CM

## 2025-06-03 DIAGNOSIS — E55.9 VITAMIN D DEFICIENCY: ICD-10-CM

## 2025-06-03 DIAGNOSIS — E66.811 CLASS 1 OBESITY WITHOUT SERIOUS COMORBIDITY WITH BODY MASS INDEX (BMI) OF 33.0 TO 33.9 IN ADULT, UNSPECIFIED OBESITY TYPE: ICD-10-CM

## 2025-06-03 DIAGNOSIS — F43.10 PTSD (POST-TRAUMATIC STRESS DISORDER): ICD-10-CM

## 2025-06-03 DIAGNOSIS — R73.03 PREDIABETES: ICD-10-CM

## 2025-06-03 PROCEDURE — 1036F TOBACCO NON-USER: CPT | Performed by: FAMILY MEDICINE

## 2025-06-03 PROCEDURE — 3008F BODY MASS INDEX DOCD: CPT | Performed by: FAMILY MEDICINE

## 2025-06-03 PROCEDURE — 99214 OFFICE O/P EST MOD 30 MIN: CPT | Performed by: FAMILY MEDICINE

## 2025-06-03 RX ORDER — TIRZEPATIDE 10 MG/.5ML
10 INJECTION, SOLUTION SUBCUTANEOUS WEEKLY
Qty: 12 EACH | Refills: 3 | Status: SHIPPED | OUTPATIENT
Start: 2025-06-03

## 2025-06-03 NOTE — PATIENT INSTRUCTIONS
annual lab review, weight management, discussing hypogonadism     ------    Immunization counseling: Up-to-date on annual flu shot and latest coronavirus booster.  Most recent Tdap around 2017. -->> Check with your pharmacy to keep up-to-date on immunizations.    Screening for hep C will be done before next appt.    Screening for prostate cancer: Not really do screening, had PSA tested for hormone panel, totally normal at 0.51.  -------    Obesity, BMI 33.  Up a couple pounds over the last 5 months or so.  On Mounjaro 7.5 mg weekly.  Still down from a max of 255.  Had been using Mounjaro 15 mg every other week. -->> Keep up the excellent work.  Will refill Mounjaro today at 10 mg dose.    Regarding previous labs::    Annual labs due around January.  -Hyperlipidemia, LDL is 130, goal is less than 100.  Total to good ratio was 5.2, goal is less than 3.4, over 5 counts as high risk. -->> Avoid fast food, use Olive, canola, and avocado oil. Avoid hydrogenated oils like Crisco or margarine. Avoid simple carbohydrates like bread, potatoes, rice, sugar. Can eat more fish, especially fatty finish like salmon. Will recheck lipid panel in 3 --12 months.      - Hypogonadism, fatigue, some erectile dysfunction, total testosterone was 233 July 2024.  Patient is on hormone therapy with [facility].  They wanted a testosterone level about now. -->>  We will draw testosterone today.   Patient will contact and to discuss arranging an appointment and follow-up and to verify they got the labs.      -Vitamin D deficiency, vitamin D was 18, goal is .  In the last week or 2 patient has started taking vitamin D3, 10,000 units / 250 mcg daily, but is not taking it as regularly as he would like to. -->>  Continue current dosing of vitamin D.  Recheck with next annual labs.      - Prediabetes, A1c 5.1, was 5.6, before that was 5.7.  Is eating better, also on Mounjaro. -->>  Keep up the excellent work.        Depression with anxiety, PTSD.   On bupropion 300 mg daily.  Feels it is helping..  Seeing psychiatry. Is also doing meditation. -->> Follow-up as planned.    bicuspid aortic valve, sees cardiology/[facility] Andrew, gets an annual echo.  On metoprolol. -->>  Follow-up as planned.    (usually gets labs done at [facility])  (Will order hep C screening next time we do labs)    - Will draw testosterone and hep C screening today.     - Patient will return in mid September for weight management and lab planning.

## 2025-06-03 NOTE — PROGRESS NOTES
"Subjective   Patient ID: Donnell Sanabria is a 39 y.o. male who presents for Discuss Mounjaro (Pt in today to discuss Mounjaro dosage).    Review of Systems  Denies N/V/D/C, no HA/S/V, denies rashes/lesions, no CP/SOB. Denies fevers/chills.  All other systems were negative.     Objective   /83 (BP Location: Left arm, Patient Position: Sitting, BP Cuff Size: Adult)   Pulse 91   Ht 1.753 m (5' 9\")   Wt 104 kg (229 lb)   SpO2 97%   BMI 33.82 kg/m²     Physical Exam  Gen: NAD  eyes: EOMI, PERRLA  ENT: hearing grossly intact, no nasal discharge  resp: CTABL, without R/R  heart: RRR without MRG  GI: abd: S/ND/NT, BS+  lymph: no axillary, cervical, supraclavicular lymphadenopathy noted   MS: gait grossly WNL,  derm: no rashes or lesions noted  neuro: CN II-XII grossly intact  psych: A&Ox3    Assessment/Plan   Problem List Items Addressed This Visit    None       annual lab review, weight management, discussing hypogonadism     ------    Immunization counseling: Up-to-date on annual flu shot and latest coronavirus booster.  Most recent Tdap around 2017. -->> Check with your pharmacy to keep up-to-date on immunizations.    Screening for hep C will be done before next appt.    Screening for prostate cancer: Not really do screening, had PSA tested for hormone panel, totally normal at 0.51.  -------    Obesity, BMI 33.  Up a couple pounds over the last 5 months or so.  On Mounjaro 7.5 mg weekly.  Still down from a max of 255.  Had been using Mounjaro 15 mg every other week. -->> Keep up the excellent work.  Will refill Mounjaro today at 10 mg dose.    Regarding previous labs::    Annual labs due around January.  -Hyperlipidemia, LDL is 130, goal is less than 100.  Total to good ratio was 5.2, goal is less than 3.4, over 5 counts as high risk. -->> Avoid fast food, use Olive, canola, and avocado oil. Avoid hydrogenated oils like Crisco or margarine. Avoid simple carbohydrates like bread, potatoes, rice, sugar. Can eat " more fish, especially fatty finish like salmon. Will recheck lipid panel in 3 --12 months.      - Hypogonadism, fatigue, some erectile dysfunction, total testosterone was 233 July 2024.  Patient is on hormone therapy with [facility].  They wanted a testosterone level about now. -->>  We will draw testosterone today.   Patient will contact and to discuss arranging an appointment and follow-up and to verify they got the labs.      -Vitamin D deficiency, vitamin D was 18, goal is .  In the last week or 2 patient has started taking vitamin D3, 10,000 units / 250 mcg daily, but is not taking it as regularly as he would like to. -->>  Continue current dosing of vitamin D.  Recheck with next annual labs.      - Prediabetes, A1c 5.1, was 5.6, before that was 5.7.  Is eating better, also on Mounjaro. -->>  Keep up the excellent work.        Depression with anxiety, PTSD.  On bupropion 300 mg daily.  Feels it is helping..  Seeing psychiatry. Is also doing meditation. -->> Follow-up as planned.    bicuspid aortic valve, history of sinus tachycardia, sees cardiology/[facility], gets an annual echo.  On metoprolol. -->>  Follow-up as planned.    (usually gets labs done at [facility])  (Will order hep C screening next time we do labs)    - Will draw testosterone and hep C screening today.     - Patient will return in mid September for weight management and lab planning.

## 2025-06-03 NOTE — LETTER
Barbie 3, 2025     Patient: Donnell Sanabria   YOB: 1985   Date of Visit: 6/3/2025       To Whom It May Concern:    Donnell Sanabria was seen in my clinic on 6/3/2025. Please excuse Donnell for his absence from work on this day to make the appointment, he was unable to work today but will return tomorrow 06/04/2025.    If you have any questions or concerns, please don't hesitate to call.         Sincerely,         Brooks Longoria, DO

## 2025-06-04 ENCOUNTER — APPOINTMENT (OUTPATIENT)
Dept: PRIMARY CARE | Facility: CLINIC | Age: 40
End: 2025-06-04
Payer: COMMERCIAL

## 2025-06-07 LAB
TESTOST FREE SERPL-MCNC: 90.9 PG/ML (ref 35–155)
TESTOST SERPL-MCNC: 476 NG/DL (ref 250–1100)

## 2025-09-10 ENCOUNTER — APPOINTMENT (OUTPATIENT)
Dept: PRIMARY CARE | Facility: CLINIC | Age: 40
End: 2025-09-10
Payer: COMMERCIAL

## (undated) DEVICE — SUTURE, PROLENE, 3-0, 36 IN, SH, DA, BLUE

## (undated) DEVICE — GOWN, SURGICAL, NON-REINFORCED, XLARGE, LF

## (undated) DEVICE — SYRINGE, 5 CC, LUER LOCK

## (undated) DEVICE — Device

## (undated) DEVICE — SUTURE, PDS II, 5-0, 18 IN, P3, CLEAR

## (undated) DEVICE — SUTURE, CHROMIC GUT, 5/0, P-3, 18IN

## (undated) DEVICE — NEEDLE, HYPODERMIC, REGULAR WALL, REGULAR BEVEL, 27 G X 1.25 IN

## (undated) DEVICE — SPONGE, GAUZE, XRAY DECT, 16 PLY, 4 X 4, W/MASTER DMT,STERILE

## (undated) DEVICE — CUP, MEDICINE, 2 OZ, METAL

## (undated) DEVICE — CLEANER, WIPE, INSTRUMENT, 3.25 X 3.25 IN

## (undated) DEVICE — SUTURE, PLAIN, 5-0, 18 IN, PC1, YELLOW

## (undated) DEVICE — CATHETER, DRAINAGE, NASOGASTRIC, SUMP, SALEM, W/ANTI-REFLUX VALVE, 18 FR, 48 IN

## (undated) DEVICE — APPLICATOR, COTTON TIP, 3 IN, NON-STERILE

## (undated) DEVICE — SYRINGE, 20 CC, CONTROL, LUER LOCK, LF

## (undated) DEVICE — MARKER, SKIN, XFINE TIP, W/RULER AND LABELS

## (undated) DEVICE — NEEDLE, HYPODERMIC, REGULAR WALL, REGULAR BEVEL, 30 G X 0.5 IN

## (undated) DEVICE — WIPE, INSTRUMENT, POLYVINYL ALCOHOL, 2 1/2 X 2 1/2

## (undated) DEVICE — BLADE, INFERIOR TURBINATE, M4 ROTATABLE, 2MM STR

## (undated) DEVICE — KNIFE, OPHTHALMIC, FULL HANDLE, 15 DEG

## (undated) DEVICE — CONTAINER, SPECIMEN, 4 OZ, OR PEEL PACK, STERILE